# Patient Record
Sex: FEMALE | Race: WHITE | Employment: UNEMPLOYED | URBAN - METROPOLITAN AREA
[De-identification: names, ages, dates, MRNs, and addresses within clinical notes are randomized per-mention and may not be internally consistent; named-entity substitution may affect disease eponyms.]

---

## 2018-10-03 ENCOUNTER — HOSPITAL ENCOUNTER (OUTPATIENT)
Dept: GENERAL RADIOLOGY | Age: 63
Discharge: HOME | End: 2018-10-05
Payer: MEDICARE

## 2018-10-03 DIAGNOSIS — Z12.31 VISIT FOR SCREENING MAMMOGRAM: ICD-10-CM

## 2018-10-03 PROCEDURE — 77067 SCR MAMMO BI INCL CAD: CPT

## 2019-07-25 ENCOUNTER — OFFICE VISIT (OUTPATIENT)
Dept: ENDOCRINOLOGY | Age: 64
End: 2019-07-25
Payer: MEDICARE

## 2019-07-25 VITALS
WEIGHT: 140.8 LBS | HEIGHT: 60 IN | BODY MASS INDEX: 27.64 KG/M2 | SYSTOLIC BLOOD PRESSURE: 120 MMHG | DIASTOLIC BLOOD PRESSURE: 78 MMHG | RESPIRATION RATE: 16 BRPM | OXYGEN SATURATION: 99 % | HEART RATE: 73 BPM

## 2019-07-25 DIAGNOSIS — E78.5 HYPERLIPIDEMIA, UNSPECIFIED HYPERLIPIDEMIA TYPE: ICD-10-CM

## 2019-07-25 DIAGNOSIS — K21.9 GASTROESOPHAGEAL REFLUX DISEASE, ESOPHAGITIS PRESENCE NOT SPECIFIED: ICD-10-CM

## 2019-07-25 PROCEDURE — G8419 CALC BMI OUT NRM PARAM NOF/U: HCPCS | Performed by: INTERNAL MEDICINE

## 2019-07-25 PROCEDURE — 1036F TOBACCO NON-USER: CPT | Performed by: INTERNAL MEDICINE

## 2019-07-25 PROCEDURE — 3017F COLORECTAL CA SCREEN DOC REV: CPT | Performed by: INTERNAL MEDICINE

## 2019-07-25 PROCEDURE — 99204 OFFICE O/P NEW MOD 45 MIN: CPT | Performed by: INTERNAL MEDICINE

## 2019-07-25 PROCEDURE — G8427 DOCREV CUR MEDS BY ELIG CLIN: HCPCS | Performed by: INTERNAL MEDICINE

## 2019-07-25 RX ORDER — FAMOTIDINE 20 MG/1
TABLET, FILM COATED ORAL 2 TIMES DAILY
COMMUNITY
Start: 2019-04-23

## 2019-07-25 RX ORDER — IBUPROFEN 800 MG/1
800 TABLET ORAL 2 TIMES DAILY
COMMUNITY
Start: 2019-04-23

## 2019-07-25 RX ORDER — ZOLPIDEM TARTRATE 5 MG/1
TABLET ORAL
Refills: 0 | COMMUNITY
Start: 2019-07-02

## 2019-07-25 RX ORDER — PRAVASTATIN SODIUM 40 MG
TABLET ORAL DAILY
COMMUNITY
Start: 2019-04-23

## 2019-07-25 RX ORDER — LISINOPRIL AND HYDROCHLOROTHIAZIDE 20; 12.5 MG/1; MG/1
TABLET ORAL DAILY
COMMUNITY
Start: 2019-04-23

## 2019-08-05 ENCOUNTER — HOSPITAL ENCOUNTER (OUTPATIENT)
Dept: ULTRASOUND IMAGING | Age: 64
Discharge: HOME | End: 2019-08-07
Payer: MEDICARE

## 2019-08-05 ENCOUNTER — HOSPITAL ENCOUNTER (OUTPATIENT)
Age: 64
Discharge: HOME | End: 2019-08-05
Payer: MEDICARE

## 2019-08-05 DIAGNOSIS — K21.9 GASTROESOPHAGEAL REFLUX DISEASE, ESOPHAGITIS PRESENCE NOT SPECIFIED: ICD-10-CM

## 2019-08-05 DIAGNOSIS — E78.5 HYPERLIPIDEMIA, UNSPECIFIED HYPERLIPIDEMIA TYPE: ICD-10-CM

## 2019-08-05 PROCEDURE — 86376 MICROSOMAL ANTIBODY EACH: CPT

## 2019-08-05 PROCEDURE — 86800 THYROGLOBULIN ANTIBODY: CPT

## 2019-08-05 PROCEDURE — 76536 US EXAM OF HEAD AND NECK: CPT

## 2019-08-07 LAB
THYROGLOBULIN AB: <0.9 IU/ML (ref 0–4)
THYROID PEROXIDASE (TPO) ABS: 0.5 IU/ML (ref 0–9)

## 2019-10-09 ENCOUNTER — HOSPITAL ENCOUNTER (OUTPATIENT)
Dept: GENERAL RADIOLOGY | Age: 64
Discharge: HOME | End: 2019-10-11
Payer: MEDICARE

## 2019-10-09 DIAGNOSIS — Z12.31 VISIT FOR SCREENING MAMMOGRAM: ICD-10-CM

## 2019-10-09 PROCEDURE — 77063 BREAST TOMOSYNTHESIS BI: CPT

## 2020-10-20 ENCOUNTER — HOSPITAL ENCOUNTER (OUTPATIENT)
Dept: GENERAL RADIOLOGY | Age: 65
Discharge: HOME | End: 2020-10-22
Payer: MEDICARE

## 2020-10-20 PROCEDURE — 77063 BREAST TOMOSYNTHESIS BI: CPT

## 2020-11-18 ENCOUNTER — HOSPITAL ENCOUNTER (OUTPATIENT)
Age: 65
Discharge: HOME | End: 2020-11-20

## 2020-11-18 PROCEDURE — 88305 TISSUE EXAM BY PATHOLOGIST: CPT

## 2020-11-18 PROCEDURE — 87081 CULTURE SCREEN ONLY: CPT

## 2020-11-18 PROCEDURE — 88342 IMHCHEM/IMCYTCHM 1ST ANTB: CPT

## 2020-11-19 LAB — CLOTEST: NORMAL

## 2021-02-20 ENCOUNTER — IMMUNIZATION (OUTPATIENT)
Dept: PRIMARY CARE CLINIC | Age: 66
End: 2021-02-20
Payer: MEDICARE

## 2021-02-20 PROCEDURE — 0001A PR IMM ADMN SARSCOV2 30MCG/0.3ML DIL RECON 1ST DOSE: CPT | Performed by: NURSE PRACTITIONER

## 2021-02-20 PROCEDURE — 91300 COVID-19, PFIZER VACCINE 30MCG/0.3ML DOSE: CPT | Performed by: NURSE PRACTITIONER

## 2021-03-15 ENCOUNTER — IMMUNIZATION (OUTPATIENT)
Dept: PRIMARY CARE CLINIC | Age: 66
End: 2021-03-15
Payer: MEDICARE

## 2021-03-15 PROCEDURE — 91300 COVID-19, PFIZER VACCINE 30MCG/0.3ML DOSE: CPT | Performed by: NURSE PRACTITIONER

## 2021-03-15 PROCEDURE — 0002A COVID-19, PFIZER VACCINE 30MCG/0.3ML DOSE: CPT | Performed by: NURSE PRACTITIONER

## 2021-10-22 ENCOUNTER — HOSPITAL ENCOUNTER (OUTPATIENT)
Dept: GENERAL RADIOLOGY | Age: 66
Discharge: HOME | End: 2021-10-24
Payer: MEDICARE

## 2021-10-22 DIAGNOSIS — Z12.31 VISIT FOR SCREENING MAMMOGRAM: ICD-10-CM

## 2021-10-22 PROCEDURE — 77063 BREAST TOMOSYNTHESIS BI: CPT

## 2022-01-25 ENCOUNTER — TELEPHONE (OUTPATIENT)
Dept: ENDOCRINOLOGY | Age: 67
End: 2022-01-25

## 2022-01-25 NOTE — TELEPHONE ENCOUNTER
Returned patients phone call.  Left voicemail stating to call back to make an appt and it can be made with either Dr Miroslava Curtis or Arabella Patricia

## 2022-03-10 ENCOUNTER — TELEPHONE (OUTPATIENT)
Dept: ENDOCRINOLOGY | Age: 67
End: 2022-03-10

## 2022-03-10 ENCOUNTER — OFFICE VISIT (OUTPATIENT)
Dept: ENDOCRINOLOGY | Age: 67
End: 2022-03-10
Payer: MEDICARE

## 2022-03-10 VITALS
OXYGEN SATURATION: 98 % | SYSTOLIC BLOOD PRESSURE: 111 MMHG | HEIGHT: 60 IN | HEART RATE: 84 BPM | WEIGHT: 141 LBS | DIASTOLIC BLOOD PRESSURE: 70 MMHG | BODY MASS INDEX: 27.68 KG/M2

## 2022-03-10 DIAGNOSIS — E04.2 MULTIPLE THYROID NODULES: Primary | ICD-10-CM

## 2022-03-10 DIAGNOSIS — E55.9 VITAMIN D DEFICIENCY: ICD-10-CM

## 2022-03-10 DIAGNOSIS — E04.2 MULTIPLE THYROID NODULES: ICD-10-CM

## 2022-03-10 LAB
T4 FREE: 1.46 NG/DL (ref 0.93–1.7)
TSH SERPL DL<=0.05 MIU/L-ACNC: 1.97 UIU/ML (ref 0.27–4.2)
VITAMIN D 25-HYDROXY: 72 NG/ML (ref 30–100)

## 2022-03-10 PROCEDURE — 1090F PRES/ABSN URINE INCON ASSESS: CPT | Performed by: CLINICAL NURSE SPECIALIST

## 2022-03-10 PROCEDURE — 99214 OFFICE O/P EST MOD 30 MIN: CPT | Performed by: CLINICAL NURSE SPECIALIST

## 2022-03-10 PROCEDURE — G8400 PT W/DXA NO RESULTS DOC: HCPCS | Performed by: CLINICAL NURSE SPECIALIST

## 2022-03-10 PROCEDURE — G8484 FLU IMMUNIZE NO ADMIN: HCPCS | Performed by: CLINICAL NURSE SPECIALIST

## 2022-03-10 PROCEDURE — 4004F PT TOBACCO SCREEN RCVD TLK: CPT | Performed by: CLINICAL NURSE SPECIALIST

## 2022-03-10 PROCEDURE — G8417 CALC BMI ABV UP PARAM F/U: HCPCS | Performed by: CLINICAL NURSE SPECIALIST

## 2022-03-10 PROCEDURE — 4040F PNEUMOC VAC/ADMIN/RCVD: CPT | Performed by: CLINICAL NURSE SPECIALIST

## 2022-03-10 PROCEDURE — 1123F ACP DISCUSS/DSCN MKR DOCD: CPT | Performed by: CLINICAL NURSE SPECIALIST

## 2022-03-10 PROCEDURE — 3017F COLORECTAL CA SCREEN DOC REV: CPT | Performed by: CLINICAL NURSE SPECIALIST

## 2022-03-10 PROCEDURE — G8427 DOCREV CUR MEDS BY ELIG CLIN: HCPCS | Performed by: CLINICAL NURSE SPECIALIST

## 2022-03-10 RX ORDER — ROSUVASTATIN CALCIUM 20 MG/1
TABLET, COATED ORAL
COMMUNITY
Start: 2022-01-21

## 2022-03-10 NOTE — TELEPHONE ENCOUNTER
----- Message from JUVE Wilson - CNS sent at 3/10/2022  3:01 PM EST -----  Please call patient and inform her thyroid function is normal

## 2022-03-28 ENCOUNTER — HOSPITAL ENCOUNTER (OUTPATIENT)
Dept: ULTRASOUND IMAGING | Age: 67
Discharge: HOME | End: 2022-03-30
Payer: MEDICARE

## 2022-03-28 PROCEDURE — 76536 US EXAM OF HEAD AND NECK: CPT

## 2022-03-30 ENCOUNTER — TELEPHONE (OUTPATIENT)
Dept: ENDOCRINOLOGY | Age: 67
End: 2022-03-30

## 2022-03-30 NOTE — TELEPHONE ENCOUNTER
----- Message from JUVE Tripathi sent at 3/29/2022  8:36 AM EDT -----  Please call patient and inform her I have reviewed thyroid ultrasound. No concerning thyroid nodules. Nodules have unchanged since 2019.   We will continue to observe

## 2022-04-22 ENCOUNTER — HOSPITAL ENCOUNTER (OUTPATIENT)
Dept: GENERAL RADIOLOGY | Age: 67
Discharge: HOME | End: 2022-04-24
Payer: MEDICARE

## 2022-04-22 VITALS — HEIGHT: 60 IN | WEIGHT: 140 LBS | BODY MASS INDEX: 27.48 KG/M2

## 2022-04-22 DIAGNOSIS — Z78.0 POSTMENOPAUSAL: ICD-10-CM

## 2022-04-22 PROCEDURE — 77080 DXA BONE DENSITY AXIAL: CPT

## 2022-05-04 ENCOUNTER — TELEPHONE (OUTPATIENT)
Dept: ORTHOPEDIC SURGERY | Age: 67
End: 2022-05-04

## 2022-05-04 DIAGNOSIS — M65.311 TRIGGER FINGER OF RIGHT THUMB: Primary | ICD-10-CM

## 2022-05-05 ENCOUNTER — OFFICE VISIT (OUTPATIENT)
Dept: ORTHOPEDIC SURGERY | Age: 67
End: 2022-05-05
Payer: MEDICARE

## 2022-05-05 VITALS — BODY MASS INDEX: 27.82 KG/M2 | WEIGHT: 138 LBS | HEIGHT: 59 IN

## 2022-05-05 DIAGNOSIS — M65.311 TRIGGER FINGER OF RIGHT THUMB: Primary | ICD-10-CM

## 2022-05-05 PROCEDURE — 1036F TOBACCO NON-USER: CPT | Performed by: ORTHOPAEDIC SURGERY

## 2022-05-05 PROCEDURE — 20550 NJX 1 TENDON SHEATH/LIGAMENT: CPT | Performed by: ORTHOPAEDIC SURGERY

## 2022-05-05 PROCEDURE — G8417 CALC BMI ABV UP PARAM F/U: HCPCS | Performed by: ORTHOPAEDIC SURGERY

## 2022-05-05 PROCEDURE — G8427 DOCREV CUR MEDS BY ELIG CLIN: HCPCS | Performed by: ORTHOPAEDIC SURGERY

## 2022-05-05 PROCEDURE — 99203 OFFICE O/P NEW LOW 30 MIN: CPT | Performed by: ORTHOPAEDIC SURGERY

## 2022-05-05 PROCEDURE — 1090F PRES/ABSN URINE INCON ASSESS: CPT | Performed by: ORTHOPAEDIC SURGERY

## 2022-05-05 PROCEDURE — 3017F COLORECTAL CA SCREEN DOC REV: CPT | Performed by: ORTHOPAEDIC SURGERY

## 2022-05-05 PROCEDURE — 4040F PNEUMOC VAC/ADMIN/RCVD: CPT | Performed by: ORTHOPAEDIC SURGERY

## 2022-05-05 PROCEDURE — 1123F ACP DISCUSS/DSCN MKR DOCD: CPT | Performed by: ORTHOPAEDIC SURGERY

## 2022-05-05 PROCEDURE — G8399 PT W/DXA RESULTS DOCUMENT: HCPCS | Performed by: ORTHOPAEDIC SURGERY

## 2022-05-05 RX ORDER — BETAMETHASONE SODIUM PHOSPHATE AND BETAMETHASONE ACETATE 3; 3 MG/ML; MG/ML
6 INJECTION, SUSPENSION INTRA-ARTICULAR; INTRALESIONAL; INTRAMUSCULAR; SOFT TISSUE ONCE
Status: COMPLETED | OUTPATIENT
Start: 2022-05-05 | End: 2022-05-05

## 2022-05-05 RX ORDER — CALCIUM CARBONATE 500(1250)
500 TABLET ORAL DAILY
COMMUNITY

## 2022-05-05 RX ADMIN — BETAMETHASONE SODIUM PHOSPHATE AND BETAMETHASONE ACETATE 6 MG: 3; 3 INJECTION, SUSPENSION INTRA-ARTICULAR; INTRALESIONAL; INTRAMUSCULAR; SOFT TISSUE at 14:30

## 2022-05-05 NOTE — PROGRESS NOTES
Department of Orthopedic Surgery  History and Physical      CHIEF COMPLAINT: Right hand pain    HISTORY OF PRESENT ILLNESS:                The patient is a right-hand-dominant 79 y.o. female who presents with pain to the right hand for the past few months. Patient notes that the pain is localized mostly to the volar base of the thumb. She also is experiencing triggering of the thumb and limits range of motion to the thumb secondary to the triggering and pain. She has noticed this pain and triggering affecting her ADLs and notes that she uses to has open a doorknob now instead of one hand. Denies numbness or tingling or paresthesias. 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 Allergies:  Penicillins    Social History:   TOBACCO:   reports that she has never smoked. She has never used smokeless tobacco.  ETOH:   reports previous alcohol use. DRUGS:   reports previous drug use.   ACTIVITIES OF DAILY LIVING:    OCCUPATION:    Family History:       Problem Relation Age of Onset    Cancer Mother         blood    Hypothyroidism Mother    Piedad Emanuel Cancer Father         blood    No Known Problems Sister     No Known Problems Daughter     Other Daughter         hyperparathyroidism       REVIEW OF SYSTEMS:  Constitutional: Negative for fevers or chills  Cardiovascular: Negative for chest pain  Respiratory: Negative for shortness of breath  Musculoskeletal: Positive for right thumb pain  Neurological: Negative for numbness or tingling  Behavioral/psychological: Negative for increased agitation    PHYSICAL EXAM:    VITALS:  Ht 4' 11\" (1.499 m)   Wt 138 lb (62.6 kg)   BMI 27.87 kg/m²   CONSTITUTIONAL:  awake, alert, cooperative, no apparent distress, and appears stated age  EYES:  Lids and lashes normal, pupils equal, round and reactive to light, extra ocular muscles intact, sclera clear, conjunctiva normal  ENT:  Normocephalic, without obvious abnormality, atraumatic, sinuses nontender on palpation, external ears without lesions, oral pharynx with moist mucus membranes, tonsils without erythema or exudates, gums normal and good dentition. NECK:  Supple, symmetrical, trachea midline, no adenopathy, thyroid symmetric, not enlarged and no tenderness, skin normal  LUNGS:  CTA  CARDIOVASCULAR:  2+ radial pulses, extremities warm and well perfused  ABDOMEN: Nondistended, NTTP  CHEST:  Atraumatic   GENITAL/URINARY:  deferred  NEUROLOGIC:  Awake, alert, oriented to name, place and time. Cranial nerves II-XII are grossly intact. Motor is 5 out of 5 bilaterally. Sensory is intact.  gait is normal.  MUSCULOSKELETAL:    Right upper extremity:  · Skin intact circumferentially  · +TTP overlying the thumb A1 pulley and index finger A1 pulley.   Thumb>index finger  · Compartments soft and compressible  · +AIN/PIN/Ulnar nerve function intact grossly  · +Radial pulse, Brisk Cap refill, hand warm and perfused  · Sensation intact to touch in radial/ulnar/median nerve distributions to hand  · Negative Tinel's at the carpal tunnel  · negative CMC grind test    DATA:    CBC:   Lab Results   Component Value Date    WBC 8.2 07/25/2017    RBC 5.04 07/25/2017    HGB 15.4 07/25/2017    HCT 46.2 07/25/2017    MCV 91.7 07/25/2017    MCH 30.6 07/25/2017    MCHC 33.3 07/25/2017    RDW 12.2 07/25/2017     07/25/2017    MPV 10.4 07/25/2017     PT/INR:  No results found for: PROTIME, INR    Radiology Review:   Xray: x-rays of the right hand were obtained today in the office and reviewed with the patient. 3 views: AP, oblique, and lateral: demonstrate no acute osseous abnormalities. No fractures or dislocations identified on this study. Impression: Normal hand radiograph    IMPRESSION:  · Right thumb trigger finger  · GERD  · Hypertension  · Hyperlipidemia  · History of thyroid nodules    Procedure Note trigger Injection    The right Thumb MCP joint volar aspect was identified as the injection site. The risk and benefits of a cortisone injection were explained and the patient consented to the injection. Under sterile conditions, the A1 pulley was injected with a mixture of 1 mL of 1% Lidocaine and 1 mL of 6 mg/mL Betamethasone without complication. A sterile bandage was applied. PLAN:  Discussed operative and nonoperative management of her condition. Is reasonable at this time to trial nonoperative management with activity modification and corticosteroid injection. Patient underwent right thumb trigger finger injection that was uncomplicated and patient tolerated well. She is to continue performing activity modification and follow-up in 6 weeks or 3 months depending on her response to the corticosteroid injection. It was explained to her that the soonest another corticosteroid injection can be performed is in 3 months from today's date. She is also to begin splinting at night. Patient notes that she has a splint already. Patient verbalized understanding of treatment plan, asked appropriate questions, and was in agreement. All questions were sought and answered today's visit. I have seen and evaluated the patient and agree with the above assessment and plan on today's visit. I have performed the key components of the history and physical examination with significant findings of trigger thumb. I concur with the findings and plan as documented.     Radha Michelle MD  5/5/2022

## 2022-05-05 NOTE — PATIENT INSTRUCTIONS
Patient Education        Trigger Finger: Care Instructions  Overview  A trigger finger is a finger stuck in a bent position. It happens when the tendon that bends and straightens the thumb or finger can't slide smoothly under the ligaments that hold the tendon against the bones. In most cases, it's caused by a bump (nodule) that forms on the tendon. The bent finger usuallystraightens out on its own. A trigger finger can be painful. But it normally isn't a serious problem. Trigger fingers seem to occur more in some groups of people. These groupsinclude:   People who have diabetes or arthritis.  People who have injured their hands in the past.   Musicians.  People who  tools often. Rest and exercises may help your finger relax so that it can bend. You may get a corticosteroid shot. This can reduce swelling and pain. Your doctor may put a splint on your finger. It will give your finger some rest. Courtney Clarke need surgery if the finger keeps locking in a bent position. Follow-up care is a key part of your treatment and safety. Be sure to make and go to all appointments, and call your doctor if you are having problems. It's also a good idea to know your test results and keep alist of the medicines you take. How can you care for yourself at home?  If your doctor put a splint on your finger, wear it as directed. Don't take it off until your doctor says you can.  You may need to change your activities to avoid movements that irritate the finger.  Take your medicines exactly as prescribed. Call your doctor if you think you are having a problem with your medicine.  Ask your doctor if you can take an over-the-counter pain medicine, such as acetaminophen (Tylenol), ibuprofen (Advil, Motrin), or naproxen (Aleve). Be safe with medicines. Read and follow all instructions on the label.  If your doctor recommends exercises, do them as directed. When should you call for help?    Call your doctor now or seek immediate medical care if:     Your finger locks in a bent position and will not straighten. Watch closely for changes in your health, and be sure to contact your doctor if:     You do not get better as expected. Where can you learn more? Go to [email or web address] and sign in to your [employer] account. Enter M826 in the mobileo box to learn more about \"Trigger Finger: Care Instructions. \"     If you do not have an account, please click on the \"Sign Up Now\" link. Current as of: July 1, 2021               Content Version: 13.2  © 7154-9489 [employer], Incorporated. Care instructions adapted under license by [facility] ([facility]). If you have questions about a medical condition or this instruction, always ask your healthcare professional. [address] any warranty or liability for your use of this information.

## 2022-06-16 ENCOUNTER — OFFICE VISIT (OUTPATIENT)
Dept: ORTHOPEDIC SURGERY | Age: 67
End: 2022-06-16
Payer: MEDICARE

## 2022-06-16 VITALS — HEIGHT: 60 IN | BODY MASS INDEX: 27.48 KG/M2 | WEIGHT: 140 LBS

## 2022-06-16 DIAGNOSIS — M65.311 TRIGGER FINGER OF RIGHT THUMB: Primary | ICD-10-CM

## 2022-06-16 PROCEDURE — 3017F COLORECTAL CA SCREEN DOC REV: CPT | Performed by: ORTHOPAEDIC SURGERY

## 2022-06-16 PROCEDURE — G8427 DOCREV CUR MEDS BY ELIG CLIN: HCPCS | Performed by: ORTHOPAEDIC SURGERY

## 2022-06-16 PROCEDURE — 1090F PRES/ABSN URINE INCON ASSESS: CPT | Performed by: ORTHOPAEDIC SURGERY

## 2022-06-16 PROCEDURE — 1123F ACP DISCUSS/DSCN MKR DOCD: CPT | Performed by: ORTHOPAEDIC SURGERY

## 2022-06-16 PROCEDURE — 99213 OFFICE O/P EST LOW 20 MIN: CPT | Performed by: ORTHOPAEDIC SURGERY

## 2022-06-16 PROCEDURE — G8399 PT W/DXA RESULTS DOCUMENT: HCPCS | Performed by: ORTHOPAEDIC SURGERY

## 2022-06-16 PROCEDURE — 1036F TOBACCO NON-USER: CPT | Performed by: ORTHOPAEDIC SURGERY

## 2022-06-16 PROCEDURE — G8417 CALC BMI ABV UP PARAM F/U: HCPCS | Performed by: ORTHOPAEDIC SURGERY

## 2022-06-16 RX ORDER — FLUTICASONE PROPIONATE 50 MCG
1-2 SPRAY, SUSPENSION (ML) NASAL
COMMUNITY
Start: 2022-03-18

## 2022-06-16 RX ORDER — AZELASTINE HYDROCHLORIDE 137 UG/1
SPRAY, METERED NASAL
COMMUNITY
Start: 2022-05-11

## 2022-06-16 NOTE — PROGRESS NOTES
Department of Orthopedic Surgery  History and Physical      CHIEF COMPLAINT: Right hand pain    HISTORY OF PRESENT ILLNESS:                The patient is a right-hand-dominant 79 y.o. female who presents with pain to the right hand for the past few months. Patient notes that the pain is localized mostly to the volar base of the thumb. She also is experiencing triggering of the thumb and limits range of motion to the thumb secondary to the triggering and pain. She has noticed this pain and triggering affecting her ADLs and notes that she uses to has open a doorknob now instead of one hand. Denies numbness or tingling or paresthesias. 6 weeks ago the patient had a cortisone injection to the right thumb A1 pulley. Patient states this did not provide her any relief of her symptoms. She is still having active triggering of her right thumb. 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 Allergies:  Penicillins    Social History:   TOBACCO:   reports that she has never smoked. She has never used smokeless tobacco.  ETOH:   reports previous alcohol use. DRUGS:   reports previous drug use.   ACTIVITIES OF DAILY LIVING:    OCCUPATION:    Family History:       Problem Relation Age of Onset    Cancer Mother         blood    Hypothyroidism Mother    Connye Sole Cancer Father         blood    No Known Problems Sister     No Known Problems Daughter     Other Daughter         hyperparathyroidism REVIEW OF SYSTEMS:  Constitutional: Negative for fevers or chills  Cardiovascular: Negative for chest pain  Respiratory: Negative for shortness of breath  Musculoskeletal: Positive for right thumb pain  Neurological: Negative for numbness or tingling  Behavioral/psychological: Negative for increased agitation    PHYSICAL EXAM:    VITALS:  Ht 5' (1.524 m)   Wt 140 lb (63.5 kg)   BMI 27.34 kg/m²   CONSTITUTIONAL:  awake, alert, cooperative, no apparent distress, and appears stated age  EYES:  Lids and lashes normal, pupils equal, round and reactive to light, extra ocular muscles intact, sclera clear, conjunctiva normal  ENT:  Normocephalic, without obvious abnormality, atraumatic, sinuses nontender on palpation, external ears without lesions, oral pharynx with moist mucus membranes, tonsils without erythema or exudates, gums normal and good dentition. NECK:  Supple, symmetrical, trachea midline, no adenopathy, thyroid symmetric, not enlarged and no tenderness, skin normal  LUNGS:  CTA  CARDIOVASCULAR:  2+ radial pulses, extremities warm and well perfused  ABDOMEN: Nondistended, NTTP  CHEST:  Atraumatic   GENITAL/URINARY:  deferred  NEUROLOGIC:  Awake, alert, oriented to name, place and time. Cranial nerves II-XII are grossly intact. Motor is 5 out of 5 bilaterally.   Sensory is intact.  gait is normal.  MUSCULOSKELETAL:    Right upper extremity:  · Skin intact circumferentially  · +TTP overlying the thumb A1 pulley  with active triggering  · Compartments soft and compressible  · +AIN/PIN/Ulnar nerve function intact grossly  · +Radial pulse, Brisk Cap refill, hand warm and perfused  · Sensation intact to touch in radial/ulnar/median nerve distributions to hand  · Negative Tinel's at the carpal tunnel  · negative CMC grind test    DATA:    CBC:   Lab Results   Component Value Date    WBC 8.2 07/25/2017    RBC 5.04 07/25/2017    HGB 15.4 07/25/2017    HCT 46.2 07/25/2017    MCV 91.7 07/25/2017    MCH 30.6 07/25/2017    MCHC 33.3 07/25/2017    RDW 12.2 07/25/2017     07/25/2017    MPV 10.4 07/25/2017     PT/INR:  No results found for: PROTIME, INR      IMPRESSION:  · Right thumb trigger finger  · GERD  · Hypertension  · Hyperlipidemia  · History of thyroid nodules      PLAN:  Discussed findings with patient. Discussed conservative and surgical management with patient. Ultimately patient decided she would like to proceed with surgical management. Plan for a right thumb trigger release. Postoperative course explained to the patient. Patient like to proceed. All questions answered. I explained the risks, benefits, alternatives and complications of surgery with the patient including but not limited to the risks of infection, possible damage to nerves, vessels, or tendons, stiffness, loss of range of motion, scar sensitivity, wound healing complications, worsening symptoms, possible need for therapy, as well as the possible need further surgery and unanticipated complications. The patient voiced understanding and all questions were answered. The patient elected to proceed with surgical intervention. I have seen and evaluated the patient and agree with the above assessment and plan on today's visit. I have performed the key components of the history and physical examination with significant findings of persistent and recalcitrant right thumb triggering. Treatment options were again explained. She like to proceed with surgical intervention. . I concur with the findings and plan as documented.     Madison Kent MD  6/16/2022

## 2022-06-22 ENCOUNTER — HOSPITAL ENCOUNTER (OUTPATIENT)
Dept: CT IMAGING | Age: 67
Discharge: HOME | End: 2022-06-24
Payer: MEDICARE

## 2022-06-22 DIAGNOSIS — J32.9 CHRONIC SINUSITIS, UNSPECIFIED LOCATION: ICD-10-CM

## 2022-06-22 PROCEDURE — 70486 CT MAXILLOFACIAL W/O DYE: CPT

## 2022-07-08 ENCOUNTER — TELEPHONE (OUTPATIENT)
Dept: ORTHOPEDIC SURGERY | Age: 67
End: 2022-07-08

## 2022-07-08 NOTE — TELEPHONE ENCOUNTER
Spoke with insurance company. No authorization required for surgery on 7/29.   Call reference # [id number]

## 2022-07-20 ENCOUNTER — CLINICAL DOCUMENTATION (OUTPATIENT)
Dept: ORTHOPEDIC SURGERY | Age: 67
End: 2022-07-20

## 2022-07-20 NOTE — PROGRESS NOTES
Department of Orthopedic Surgery  History and Physical      CHIEF COMPLAINT: Right hand pain    HISTORY OF PRESENT ILLNESS:                The patient is a right-hand-dominant 79 y.o. female who presents with pain to the right hand for the past few months. Patient notes that the pain is localized mostly to the volar base of the thumb. She also is experiencing triggering of the thumb and limits range of motion to the thumb secondary to the triggering and pain. She has noticed this pain and triggering affecting her ADLs and notes that she uses to has open a doorknob now instead of one hand. Denies numbness or tingling or paresthesias. 6 weeks ago the patient had a cortisone injection to the right thumb A1 pulley. Patient states this did not provide her any relief of her symptoms. She is still having active triggering of her right thumb. 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 Allergies:  Penicillins    Social History:   TOBACCO:   reports that she has never smoked. She has never used smokeless tobacco.  ETOH:   reports that she does not currently use alcohol. DRUGS:   reports that she does not currently use drugs.   ACTIVITIES OF DAILY LIVING:    OCCUPATION:    Family History:       Problem Relation Age of Onset    Cancer Mother         blood    Hypothyroidism Mother     Cancer Father         blood    No Known Problems Sister     No Known Problems Daughter     Other Daughter hyperparathyroidism       REVIEW OF SYSTEMS:  Constitutional: Negative for fevers or chills  Cardiovascular: Negative for chest pain  Respiratory: Negative for shortness of breath  Musculoskeletal: Positive for right thumb pain  Neurological: Negative for numbness or tingling  Behavioral/psychological: Negative for increased agitation    PHYSICAL EXAM:    VITALS:  There were no vitals taken for this visit. CONSTITUTIONAL:  awake, alert, cooperative, no apparent distress, and appears stated age  EYES:  Lids and lashes normal, pupils equal, round and reactive to light, extra ocular muscles intact, sclera clear, conjunctiva normal  ENT:  Normocephalic, without obvious abnormality, atraumatic, sinuses nontender on palpation, external ears without lesions, oral pharynx with moist mucus membranes, tonsils without erythema or exudates, gums normal and good dentition. NECK:  Supple, symmetrical, trachea midline, no adenopathy, thyroid symmetric, not enlarged and no tenderness, skin normal  LUNGS:  CTA  CARDIOVASCULAR:  2+ radial pulses, extremities warm and well perfused  ABDOMEN: Nondistended, NTTP  CHEST:  Atraumatic   GENITAL/URINARY:  deferred  NEUROLOGIC:  Awake, alert, oriented to name, place and time. Cranial nerves II-XII are grossly intact. Motor is 5 out of 5 bilaterally.   Sensory is intact.  gait is normal.  MUSCULOSKELETAL:    Right upper extremity:  Skin intact circumferentially  +TTP overlying the thumb A1 pulley  with active triggering  Compartments soft and compressible  +AIN/PIN/Ulnar nerve function intact grossly  +Radial pulse, Brisk Cap refill, hand warm and perfused  Sensation intact to touch in radial/ulnar/median nerve distributions to hand  Negative Tinel's at the carpal tunnel  negative CMC grind test    DATA:    CBC:   Lab Results   Component Value Date/Time    WBC 8.2 07/25/2017 01:25 PM    RBC 5.04 07/25/2017 01:25 PM    HGB 15.4 07/25/2017 01:25 PM    HCT 46.2 07/25/2017 01:25 PM    MCV 91.7 07/25/2017 01:25 PM    MCH 30.6 07/25/2017 01:25 PM    MCHC 33.3 07/25/2017 01:25 PM    RDW 12.2 07/25/2017 01:25 PM     07/25/2017 01:25 PM    MPV 10.4 07/25/2017 01:25 PM     PT/INR:  No results found for: PROTIME, INR      IMPRESSION:  Right thumb trigger finger  GERD  Hypertension  Hyperlipidemia  History of thyroid nodules      PLAN:  Discussed findings with patient. Discussed conservative and surgical management with patient. Ultimately patient decided she would like to proceed with surgical management. Plan for a right thumb trigger release. Postoperative course explained to the patient. Patient like to proceed. All questions answered. I explained the risks, benefits, alternatives and complications of surgery with the patient including but not limited to the risks of infection, possible damage to nerves, vessels, or tendons, stiffness, loss of range of motion, scar sensitivity, wound healing complications, worsening symptoms, possible need for therapy, as well as the possible need further surgery and unanticipated complications. The patient voiced understanding and all questions were answered. The patient elected to proceed with surgical intervention. I have seen and evaluated the patient and agree with the above assessment and plan on today's visit. I have performed the key components of the history and physical examination with significant findings of persistent and recalcitrant right thumb triggering. Treatment options were again explained. She like to proceed with surgical intervention. . I concur with the findings and plan as documented. History and Physical Update     Patient was seen and examined. Patient's history and physical was reviewed with the patient. There has been no significant interval changes.       Electronically signed by VANI Victor on 7/20/2022 at 8:27 AM

## 2022-07-29 ENCOUNTER — TELEPHONE (OUTPATIENT)
Dept: ORTHOPEDIC SURGERY | Age: 67
End: 2022-07-29

## 2022-07-29 DIAGNOSIS — M65.311 TRIGGER FINGER OF RIGHT THUMB: Primary | ICD-10-CM

## 2022-07-29 RX ORDER — HYDROCODONE BITARTRATE AND ACETAMINOPHEN 5; 325 MG/1; MG/1
1 TABLET ORAL EVERY 4 HOURS PRN
Qty: 12 TABLET | Refills: 0 | Status: SHIPPED | OUTPATIENT
Start: 2022-07-29 | End: 2022-08-01

## 2022-08-08 ENCOUNTER — TELEPHONE (OUTPATIENT)
Dept: ORTHOPEDIC SURGERY | Age: 67
End: 2022-08-08

## 2022-08-08 NOTE — TELEPHONE ENCOUNTER
Patient was called back asked to cancel appointment today due to back spasms. She is trying to get medication from PCP. She will call later for update so she can be rescheduled later today or this week.

## 2022-08-08 NOTE — TELEPHONE ENCOUNTER
Pt called in to r/s her post op appt, she is having back spasms. She would like to know if she can come in this Thursday? Liangteo Sanderse can be reached at [phone]. Unable to reach office due to pt care.

## 2022-08-18 ENCOUNTER — OFFICE VISIT (OUTPATIENT)
Dept: ORTHOPEDIC SURGERY | Age: 67
End: 2022-08-18

## 2022-08-18 VITALS — RESPIRATION RATE: 20 BRPM | HEIGHT: 60 IN | BODY MASS INDEX: 27.48 KG/M2 | WEIGHT: 140 LBS

## 2022-08-18 DIAGNOSIS — M54.42 CHRONIC BILATERAL LOW BACK PAIN WITH BILATERAL SCIATICA: Primary | ICD-10-CM

## 2022-08-18 DIAGNOSIS — M65.311 TRIGGER FINGER OF RIGHT THUMB: ICD-10-CM

## 2022-08-18 DIAGNOSIS — G89.29 CHRONIC BILATERAL LOW BACK PAIN WITH BILATERAL SCIATICA: Primary | ICD-10-CM

## 2022-08-18 DIAGNOSIS — M54.41 CHRONIC BILATERAL LOW BACK PAIN WITH BILATERAL SCIATICA: Primary | ICD-10-CM

## 2022-08-18 PROCEDURE — 99024 POSTOP FOLLOW-UP VISIT: CPT | Performed by: ORTHOPAEDIC SURGERY

## 2022-08-18 NOTE — PROGRESS NOTES
HPI:   Patient follows up today s/p right thumb trigger finger release. Patient is now 3 weeks out from her operative procedure. She had her neighbor take her sutures out. She denies any new issues other than some sensitivity over her scar. She uses the hand for ADLs without issue. No complaints. Pain controlled. Physical Exam:  right thumb incision clean, dry, and intact  No signs of infection, some previous callous has been removed showing clean, healthy tissue around her scar. ROM appropriate  NVI  Radial, median, and ulnar sensation intact to light touch  Brisk capillary refill  Muscle strength 5/5 grossly    Assessment:  Post-op 3 weeks from right thumb trigger finger release. Plan:  Patient may begin silicone coverings for scar sensitivity. She will continue to do scar massage for sensitivity as well. Follow up as needed for any issues. 8/18/2022  Martinez Larry,      I have seen and evaluated the patient and agree with the above assessment and plan on today's visit. I have performed the key components of the history and physical examination with significant findings of 3 weeks postop. I concur with the findings and plan as documented.     Cary Simmons MD  8/18/2022

## 2022-10-24 ENCOUNTER — HOSPITAL ENCOUNTER (OUTPATIENT)
Dept: GENERAL RADIOLOGY | Age: 67
Discharge: HOME | End: 2022-10-26
Payer: MEDICARE

## 2022-10-24 DIAGNOSIS — Z12.31 ENCOUNTER FOR SCREENING MAMMOGRAM FOR MALIGNANT NEOPLASM OF BREAST: ICD-10-CM

## 2022-10-24 PROCEDURE — 77067 SCR MAMMO BI INCL CAD: CPT

## 2023-01-18 ENCOUNTER — OFFICE VISIT (OUTPATIENT)
Dept: NEUROSURGERY | Age: 68
End: 2023-01-18
Payer: MEDICARE

## 2023-01-18 VITALS
TEMPERATURE: 98 F | WEIGHT: 140 LBS | OXYGEN SATURATION: 98 % | DIASTOLIC BLOOD PRESSURE: 85 MMHG | RESPIRATION RATE: 16 BRPM | BODY MASS INDEX: 27.34 KG/M2 | SYSTOLIC BLOOD PRESSURE: 135 MMHG | HEART RATE: 98 BPM

## 2023-01-18 DIAGNOSIS — M54.42 ACUTE MIDLINE LOW BACK PAIN WITH BILATERAL SCIATICA: Primary | ICD-10-CM

## 2023-01-18 DIAGNOSIS — M54.41 ACUTE MIDLINE LOW BACK PAIN WITH BILATERAL SCIATICA: Primary | ICD-10-CM

## 2023-01-18 PROCEDURE — G8484 FLU IMMUNIZE NO ADMIN: HCPCS | Performed by: NEUROLOGICAL SURGERY

## 2023-01-18 PROCEDURE — 3017F COLORECTAL CA SCREEN DOC REV: CPT | Performed by: NEUROLOGICAL SURGERY

## 2023-01-18 PROCEDURE — G8417 CALC BMI ABV UP PARAM F/U: HCPCS | Performed by: NEUROLOGICAL SURGERY

## 2023-01-18 PROCEDURE — G8427 DOCREV CUR MEDS BY ELIG CLIN: HCPCS | Performed by: NEUROLOGICAL SURGERY

## 2023-01-18 PROCEDURE — G8399 PT W/DXA RESULTS DOCUMENT: HCPCS | Performed by: NEUROLOGICAL SURGERY

## 2023-01-18 PROCEDURE — 1090F PRES/ABSN URINE INCON ASSESS: CPT | Performed by: NEUROLOGICAL SURGERY

## 2023-01-18 PROCEDURE — 1036F TOBACCO NON-USER: CPT | Performed by: NEUROLOGICAL SURGERY

## 2023-01-18 PROCEDURE — 1123F ACP DISCUSS/DSCN MKR DOCD: CPT | Performed by: NEUROLOGICAL SURGERY

## 2023-01-18 PROCEDURE — 99202 OFFICE O/P NEW SF 15 MIN: CPT

## 2023-01-18 PROCEDURE — 99204 OFFICE O/P NEW MOD 45 MIN: CPT | Performed by: NEUROLOGICAL SURGERY

## 2023-01-18 ASSESSMENT — ENCOUNTER SYMPTOMS
RESPIRATORY NEGATIVE: 1
EYES NEGATIVE: 1
BACK PAIN: 1
GASTROINTESTINAL NEGATIVE: 1
ALLERGIC/IMMUNOLOGIC NEGATIVE: 1

## 2023-01-18 NOTE — PROGRESS NOTES
Abby Peace (:  1955) is a 79 y.o. female,New patient, here for evaluation of the following chief complaint(s):  Back Pain (Low back pain   brought disc had injections done at pain clinic  therapy done in the past but not this time  )         ASSESSMENT/PLAN:  1. Acute midline low back pain with bilateral sciatica  79year old lady who presents with back and leg pain. Her MRI shows stenosis and spondylolisthesis at L4-L5. She has failed physical therapy and epidurals. I am recommending an L4-L5 posterior lumbar interbody fusion. Risks, benefits and alternatives of surgery have been discussed. She will think about it. She is a non-smoker    No follow-ups on file. Subjective   SUBJECTIVE/OBJECTIVE:  HPI  79year old lady who presents with back pain. The pain radiates into both legs. The pain is made worse with activity and better with rest.  She has tried physical therapy and epidural injections. The pain is rated as 7/10. She admits to numbness, tingling and weakness in her legs. She denies loss of control of bowel or bladder function. She cant walk too far. The pain is described as sharp, stabbing, burning, cramping and shooting. Review of Systems   Constitutional: Negative. HENT: Negative. Eyes: Negative. Respiratory: Negative. Cardiovascular: Negative. Gastrointestinal: Negative. Endocrine: Negative. Genitourinary: Negative. Musculoskeletal:  Positive for arthralgias and back pain. Skin: Negative. Allergic/Immunologic: Negative. Neurological: Negative. Hematological:  Bruises/bleeds easily. Psychiatric/Behavioral:  Positive for dysphoric mood. Objective   Physical Exam  Vitals reviewed. Constitutional:       General: She is not in acute distress. Appearance: Normal appearance. She is normal weight. She is not ill-appearing, toxic-appearing or diaphoretic. HENT:      Head: Normocephalic and atraumatic.       Nose: Nose normal.   Eyes:      General: No visual field deficit.     Extraocular Movements: Extraocular movements intact.      Conjunctiva/sclera: Conjunctivae normal.      Pupils: Pupils are equal, round, and reactive to light.   Pulmonary:      Effort: Pulmonary effort is normal. No respiratory distress.   Abdominal:      General: Abdomen is flat. There is no distension.   Musculoskeletal:         General: No swelling, tenderness, deformity or signs of injury.      Right lower leg: No edema.      Left lower leg: No edema.   Skin:     Capillary Refill: Capillary refill takes less than 2 seconds.      Coloration: Skin is not jaundiced or pale.      Findings: No bruising, erythema, lesion or rash.   Neurological:      General: No focal deficit present.      Mental Status: She is alert and oriented to person, place, and time. Mental status is at baseline.      Cranial Nerves: No cranial nerve deficit or facial asymmetry.      Sensory: Sensation is intact. No sensory deficit.      Motor: Weakness present. No tremor, atrophy, abnormal muscle tone, seizure activity or pronator drift.      Coordination: Coordination normal.      Gait: Gait normal.      Deep Tendon Reflexes: Reflexes normal. Babinski sign absent on the right side. Babinski sign absent on the left side.      Reflex Scores:       Tricep reflexes are 2+ on the right side and 2+ on the left side.       Bicep reflexes are 2+ on the right side and 2+ on the left side.       Brachioradialis reflexes are 2+ on the right side and 2+ on the left side.       Patellar reflexes are 2+ on the right side and 2+ on the left side.       Achilles reflexes are 2+ on the right side and 2+ on the left side.     Comments: 4/5 in RLE   Psychiatric:         Mood and Affect: Mood normal.         Behavior: Behavior normal.         Thought Content: Thought content normal.         Judgment: Judgment normal.          On this date 1/18/2023 I have spent 45 minutes reviewing previous notes, test  results and face to face with the patient discussing the diagnosis and importance of compliance with the treatment plan as well as documenting on the day of the visit. An electronic signature was used to authenticate this note.     --Jose Anderson MD

## 2023-02-03 ENCOUNTER — TELEPHONE (OUTPATIENT)
Dept: NEUROSURGERY | Age: 68
End: 2023-02-03

## 2023-02-03 NOTE — TELEPHONE ENCOUNTER
Patient would like to proceed with surgery with Dr Teddy Altman. She would like surgery last 2 wks of April.

## 2023-02-08 DIAGNOSIS — M43.16 SPONDYLOLISTHESIS OF LUMBAR REGION: Primary | ICD-10-CM

## 2023-03-08 NOTE — PROGRESS NOTES
[address] [facility] Department of Endocrinology Diabetes and Metabolism   [address] [facility] [address]   Phone: [phone]  Fax: [phone]    Date of Service: 3/10/2022    Primary Care Physician: Ajay Buck DO  Referring physician: No ref. provider found  Provider: JUVE Azar - CNS     Reason for the visit:  Thyroid nodules      History of Present Illness: The history is provided by the patient. No  was used. Accuracy of the patient data is excellent. Goldie Ennis is a very pleasant 77 y.o. female seen today for thyroid nodules     Dx with thyroid nodules in 1999   Context:  GYN felt enlargement of her thyroid gland. Ultrasound was performed that showed thyroid nodule. Last thyroid ultrasound 8/2019 showed left lobe measuring 5.3 x 1.5 x 1.6 and right lobe to measure 5.2 x 1.6 x 1.7. Isthmus 4 mm  2 subcentimeter thyroid nodules in the left lobe identified  TPO ab 0.5 (8/2019)   TSH 0.65 (7/2019)  TSH WNL (9/21)  Has been sick with sinusitis   No biotin  No vitamin B12 complex  On vitamin D supplementation      PAST MEDICAL HISTORY   Past Medical History:   Diagnosis Date    GERD (gastroesophageal reflux disease)     HLD (hyperlipidemia)     HTN (hypertension)        PAST SURGICAL HISTORY   Past Surgical History:   Procedure Laterality Date    FOOT SURGERY      clay's neuroma    LUMBAR DISCECTOMY         SOCIAL HISTORY   Tobacco:   reports that she has never smoked. She has never used smokeless tobacco.  Alcohol:   reports previous alcohol use. Drugs:   reports previous drug use.     FAMILY HISTORY   Family History   Problem Relation Age of Onset    Cancer Mother         blood    Hypothyroidism Mother     Cancer Father         blood    No Known Problems Sister     No Known Problems Daughter     Other Daughter         hyperparathyroidism       ALLERGIES AND DRUG REACTIONS   Allergies   Allergen Reactions    Penicillins Hives and Swelling     Thoat closes       CURRENT MEDICATIONS   Current Outpatient Medications   Medication Sig Dispense Refill    Cholecalciferol (VITAMIN D3) 5000 units TABS Take by mouth daily      lisinopril-hydrochlorothiazide (PRINZIDE;ZESTORETIC) 20-12.5 MG per tablet daily       famotidine (PEPCID) 20 MG tablet 2 times daily       ibuprofen (ADVIL;MOTRIN) 800 MG tablet 800 mg 2 times daily       zolpidem (AMBIEN) 5 MG tablet TAKE 1 TABLET BY MOUTH EVERY DAY AT BEDTIME AS NEEDED  0    rosuvastatin (CRESTOR) 20 MG tablet TAKE ONE TABLET BY MOUTH EVERY DAY      pravastatin (PRAVACHOL) 40 MG tablet daily  (Patient not taking: Reported on 3/10/2022)       No current facility-administered medications for this visit. Review of Systems  Constitutional: No fever, no chills, no diaphoresis, no generalized weakness. HEENT: No blurred vision, No sore throat, no ear pain, no hair loss  Neck: denied any neck swelling, difficulty swallowing,   Cardio-pulmonary: No CP, SOB or palpitation, No orthopnea or PND. No cough or wheezing. GI: No N/V/D, no constipation, No abdominal pain, no melena or hematochezia   : Denied any dysuria, hematuria, flank pain, discharge, or incontinence. Skin: denied any rash, ulcer, Hirsute, or hyperpigmentation. MSK: denied any joint deformity, joint pain/swelling, muscle pain, or back pain. Neuro: no numbness, no tingling, no weakness, _    OBJECTIVE    /70   Pulse 84   Ht 5' (1.524 m)   Wt 141 lb (64 kg)   SpO2 98%   BMI 27.54 kg/m²   BP Readings from Last 4 Encounters:   03/10/22 111/70   07/25/19 120/78     Wt Readings from Last 6 Encounters:   03/10/22 141 lb (64 kg)   07/25/19 140 lb 12.8 oz (63.9 kg)       Physical examination:  General: awake alert, oriented x3, no abnormal position or movements. HEENT: normocephalic non-traumatic, no exophthalmos   Neck: supple, no LN enlargement, no thyromegaly, no thyroid tenderness, no JVD.   Pulm: Clear equal air entry no added sounds, no wheezing or rhonchi    CVS: S1 + S2, no murmur, no heave. Dorsalis pedis pulse palpable   Abd: soft lax, no tenderness, no organomegaly, audible bowel sounds. Skin: warm, no lesions, no rash. Musculoskeletal: No back tenderness, no kyphosis/scoliosis    Neuro: CN intact,  , muscle power normal  Psych: normal mood, and affect      Review of Laboratory Data:  I personally reviewed the following lab:  Lab Results   Component Value Date/Time    WBC 8.2 07/25/2017 01:25 PM    RBC 5.04 07/25/2017 01:25 PM    HGB 15.4 07/25/2017 01:25 PM    HCT 46.2 07/25/2017 01:25 PM    MCV 91.7 07/25/2017 01:25 PM    MCH 30.6 07/25/2017 01:25 PM    MCHC 33.3 07/25/2017 01:25 PM    RDW 12.2 07/25/2017 01:25 PM     07/25/2017 01:25 PM    MPV 10.4 07/25/2017 01:25 PM      Lab Results   Component Value Date/Time     07/25/2017 01:25 PM    K 4.2 07/25/2017 01:25 PM    CO2 23 07/25/2017 01:25 PM    BUN 25 (H) 07/25/2017 01:25 PM    CREATININE 0.8 07/25/2017 01:25 PM    CALCIUM 10.4 (H) 07/25/2017 01:25 PM    LABGLOM >60 07/25/2017 01:25 PM    GFRAA >60 07/25/2017 01:25 PM      Lab Results   Component Value Date/Time    TPOABS 0.5 08/05/2019 03:18 PM    THGAB <0.9 08/05/2019 03:18 PM     Lab Results   Component Value Date    LABA1C 5.6 07/25/2017    GLUCOSE 93 07/25/2017     Lab Results   Component Value Date    LABA1C 5.6 07/25/2017     No results found for: TRIG, HDL, LDLCALC, CHOL  No results found for: Steve Guerra, a 77 y.o.-old female seen in for the following issues       Assessment:      Diagnosis Orders   1. Multiple thyroid nodules  TSH    T4, Free    US THYROID   2. Vitamin D deficiency  Vitamin D 25 Hydroxy    US THYROID       Plan:     1.  Multiple thyroid nodules   Patient with history of subcentimeter left thyroid nodules  Counseled on thyroid nodules  We will repeat thyroid ultrasound  Check TSH and free T4  Further recommendations will be made after results are obtained  No compressive symptoms  No symptoms of hypo-/hyperthyroidism     2. Vitamin D deficiency   Continue vitamin D supplementation. Will assess vitamin D         I personally spent > 30 minutes reviewing  external notes from PCP and other patient's care team providers, and personally interpreted labs associated with the above diagnosis. I also ordered labs to further assess and manage the above addressed medical conditions. Return in about 1 year (around 3/10/2023). The above issues were reviewed with the patient who understood and agreed with the plan. Thank you for allowing us to participate in the care of this patient. Please do not hesitate to contact us with any additional questions. JUVE Garcia     [facility] Diabetes Care and Endocrinology   [address] [address]   Phone: [phone]  Fax: [phone]  --------------------------------------------  An electronic signature was used to authenticate this note.  El PINEDA on 3/10/2022 at 8:31 AM Warm

## 2023-03-30 ENCOUNTER — TELEPHONE (OUTPATIENT)
Dept: NEUROSURGERY | Age: 68
End: 2023-03-30

## 2023-03-30 ENCOUNTER — PREP FOR PROCEDURE (OUTPATIENT)
Dept: NEUROSURGERY | Age: 68
End: 2023-03-30

## 2023-03-30 PROBLEM — M43.16 SPONDYLOLISTHESIS OF LUMBAR REGION: Status: ACTIVE | Noted: 2023-03-30

## 2023-03-30 NOTE — TELEPHONE ENCOUNTER
Prior Authorization Form:      DEMOGRAPHICS:                     Patient Name:  Farzaneh Blas  Patient :  1955            Insurance:  Payor: Armando Rodriguesdle / Plan: [id number] / Product Type: *No Product type* /   Insurance ID Number:    Payer/Plan Subscr  Sex Relation Sub. Ins. ID Effective Group Num   1.  [employer]HC MEDICARE * Dianna THOMAS 1955 Female Self [id number] 3/7/22 [id number]                                    [address] [id number]         DIAGNOSIS & PROCEDURE:                       Procedure/Operation: L4-L5 PLIF           CPT Code: 03666, 74003, 56443, 81537, 82896, 42582, 87737    Diagnosis:  L4-L5 spindylolisthesis    ICD10 Code: M43.16    Location:  main    Surgeon:  Marcie Amezcua INFORMATION:                          Date:     Time: follow              Anesthesia:  General                                                       Status:  Outpatient        Special Comments:  Candace:  Jorge L Dumont       Electronically signed by Dianna Carmona MA on 3/30/2023 at 9:23 AM

## 2023-03-31 ENCOUNTER — PREP FOR PROCEDURE (OUTPATIENT)
Dept: NEUROSURGERY | Age: 68
End: 2023-03-31

## 2023-03-31 DIAGNOSIS — Z01.818 PRE-OP TESTING: Primary | ICD-10-CM

## 2023-03-31 RX ORDER — SODIUM CHLORIDE 0.9 % (FLUSH) 0.9 %
5-40 SYRINGE (ML) INJECTION EVERY 12 HOURS SCHEDULED
Status: CANCELLED | OUTPATIENT
Start: 2023-03-31

## 2023-03-31 RX ORDER — SODIUM CHLORIDE 0.9 % (FLUSH) 0.9 %
5-40 SYRINGE (ML) INJECTION PRN
Status: CANCELLED | OUTPATIENT
Start: 2023-03-31

## 2023-03-31 RX ORDER — SODIUM CHLORIDE 9 MG/ML
INJECTION, SOLUTION INTRAVENOUS CONTINUOUS
Status: CANCELLED | OUTPATIENT
Start: 2023-03-31

## 2023-03-31 RX ORDER — SODIUM CHLORIDE 9 MG/ML
INJECTION, SOLUTION INTRAVENOUS PRN
Status: CANCELLED | OUTPATIENT
Start: 2023-03-31

## 2023-04-05 ENCOUNTER — OFFICE VISIT (OUTPATIENT)
Dept: CARDIOLOGY CLINIC | Age: 68
End: 2023-04-05
Payer: MEDICARE

## 2023-04-05 VITALS
RESPIRATION RATE: 16 BRPM | SYSTOLIC BLOOD PRESSURE: 120 MMHG | BODY MASS INDEX: 27.72 KG/M2 | WEIGHT: 141.2 LBS | DIASTOLIC BLOOD PRESSURE: 78 MMHG | HEIGHT: 60 IN | OXYGEN SATURATION: 96 % | HEART RATE: 92 BPM

## 2023-04-05 DIAGNOSIS — M43.16 SPONDYLOLISTHESIS OF LUMBAR REGION: ICD-10-CM

## 2023-04-05 DIAGNOSIS — Z01.810 PREOP CARDIOVASCULAR EXAM: Primary | ICD-10-CM

## 2023-04-05 PROCEDURE — G8417 CALC BMI ABV UP PARAM F/U: HCPCS | Performed by: INTERNAL MEDICINE

## 2023-04-05 PROCEDURE — 93000 ELECTROCARDIOGRAM COMPLETE: CPT | Performed by: INTERNAL MEDICINE

## 2023-04-05 PROCEDURE — 99203 OFFICE O/P NEW LOW 30 MIN: CPT | Performed by: INTERNAL MEDICINE

## 2023-04-05 PROCEDURE — G8399 PT W/DXA RESULTS DOCUMENT: HCPCS | Performed by: INTERNAL MEDICINE

## 2023-04-05 PROCEDURE — G8427 DOCREV CUR MEDS BY ELIG CLIN: HCPCS | Performed by: INTERNAL MEDICINE

## 2023-04-05 PROCEDURE — 3017F COLORECTAL CA SCREEN DOC REV: CPT | Performed by: INTERNAL MEDICINE

## 2023-04-05 PROCEDURE — 1123F ACP DISCUSS/DSCN MKR DOCD: CPT | Performed by: INTERNAL MEDICINE

## 2023-04-05 PROCEDURE — 1090F PRES/ABSN URINE INCON ASSESS: CPT | Performed by: INTERNAL MEDICINE

## 2023-04-05 PROCEDURE — 1036F TOBACCO NON-USER: CPT | Performed by: INTERNAL MEDICINE

## 2023-04-05 NOTE — PROGRESS NOTES
Position: Sitting, Cuff Size: Medium Adult)   Pulse 92   Resp 16   Ht 5' (1.524 m)   Wt 141 lb 3.2 oz (64 kg)   SpO2 96%   BMI 27.58 kg/m²   Constitutional: Oriented to person, place, and time. Obese No distress. Head: Normocephalic and atraumatic. Neck: No carotid bruit, no JVD present. Cardiovascular: Normal rate, regular rhythm, normal heart sounds and intact distal pulses. No murmur heard. Pulmonary/Chest: Effort normal and breath sounds normal.    Abdominal: Soft. Bowel sounds are normal.  Musculoskeletal: No edema   Neurological: Alert and oriented to person, place, and time. Skin: Skin is warm and dry. Psychiatric: Normal mood and affect. Behavior is normal.     EKG:  normal sinus rhythm, IRBBB, there are no previous tracings available for comparison. ASSESSMENT AND PLAN:  Patient Active Problem List   Diagnosis    HLD (hyperlipidemia)    GERD (gastroesophageal reflux disease)    Spondylolisthesis of lumbar region        Patient has no high risk clinical predictors of an adverse outcome in surgery, such as recent myocardial infarction, unstable angina, heart failure, rhythm other than sinus, severe obstructive valvular disease,cva, insulin, ckd  Normal CV exam and ekg without Q waves  RCRI Class I risk (< 1% chance of cardiac complication).        Ethan Morataya M.D  [facility]

## 2023-04-12 RX ORDER — FAMOTIDINE 40 MG/1
TABLET, FILM COATED ORAL
COMMUNITY
Start: 2023-03-21

## 2023-04-12 RX ORDER — ROSUVASTATIN CALCIUM 20 MG/1
20 TABLET, COATED ORAL DAILY
COMMUNITY

## 2023-04-12 RX ORDER — ACETAMINOPHEN 325 MG/1
650 TABLET ORAL EVERY 6 HOURS PRN
COMMUNITY

## 2023-04-19 ENCOUNTER — HOSPITAL ENCOUNTER (OUTPATIENT)
Dept: GENERAL RADIOLOGY | Age: 68
Discharge: HOME | End: 2023-04-21
Payer: MEDICARE

## 2023-04-19 ENCOUNTER — HOSPITAL ENCOUNTER (OUTPATIENT)
Dept: PREADMISSION TESTING | Age: 68
Discharge: HOME | End: 2023-04-19
Payer: MEDICARE

## 2023-04-19 VITALS
BODY MASS INDEX: 26.9 KG/M2 | DIASTOLIC BLOOD PRESSURE: 68 MMHG | TEMPERATURE: 98 F | SYSTOLIC BLOOD PRESSURE: 128 MMHG | RESPIRATION RATE: 20 BRPM | OXYGEN SATURATION: 99 % | HEIGHT: 60 IN | HEART RATE: 85 BPM | WEIGHT: 137 LBS

## 2023-04-19 DIAGNOSIS — Z01.812 PRE-OPERATIVE LABORATORY EXAMINATION: ICD-10-CM

## 2023-04-19 DIAGNOSIS — Z01.818 PRE-OP TESTING: ICD-10-CM

## 2023-04-19 LAB
ABO + RH BLD: NORMAL
AMORPH SED URNS QL MICRO: ABNORMAL
ANION GAP SERPL CALCULATED.3IONS-SCNC: 13 MMOL/L (ref 7–16)
BACTERIA URNS QL MICRO: ABNORMAL /HPF
BASOPHILS # BLD: 0.03 E9/L (ref 0–0.2)
BASOPHILS NFR BLD: 0.4 % (ref 0–2)
BILIRUB UR QL STRIP: NEGATIVE
BLD GP AB SCN SERPL QL: NORMAL
BUN SERPL-MCNC: 19 MG/DL (ref 6–23)
CALCIUM SERPL-MCNC: 10.2 MG/DL (ref 8.6–10.2)
CHLORIDE SERPL-SCNC: 101 MMOL/L (ref 98–107)
CLARITY UR: NORMAL
CO2 SERPL-SCNC: 23 MMOL/L (ref 22–29)
COLOR UR: YELLOW
CREAT SERPL-MCNC: 0.9 MG/DL (ref 0.5–1)
EOSINOPHIL # BLD: 0.12 E9/L (ref 0.05–0.5)
EOSINOPHIL NFR BLD: 1.6 % (ref 0–6)
ERYTHROCYTE [DISTWIDTH] IN BLOOD BY AUTOMATED COUNT: 12.6 FL (ref 11.5–15)
GLUCOSE SERPL-MCNC: 123 MG/DL (ref 74–99)
GLUCOSE UR STRIP-MCNC: NEGATIVE MG/DL
HCT VFR BLD AUTO: 41.1 % (ref 34–48)
HGB BLD-MCNC: 14.1 G/DL (ref 11.5–15.5)
HGB UR QL STRIP: NEGATIVE
IMM GRANULOCYTES # BLD: 0.03 E9/L
IMM GRANULOCYTES NFR BLD: 0.4 % (ref 0–5)
INR BLD: 1
KETONES UR STRIP-MCNC: NEGATIVE MG/DL
LEUKOCYTE ESTERASE UR QL STRIP: NEGATIVE
LYMPHOCYTES # BLD: 2.92 E9/L (ref 1.5–4)
LYMPHOCYTES NFR BLD: 38.7 % (ref 20–42)
MCH RBC QN AUTO: 31.3 PG (ref 26–35)
MCHC RBC AUTO-ENTMCNC: 34.3 % (ref 32–34.5)
MCV RBC AUTO: 91.3 FL (ref 80–99.9)
MONOCYTES # BLD: 0.46 E9/L (ref 0.1–0.95)
MONOCYTES NFR BLD: 6.1 % (ref 2–12)
NEUTROPHILS # BLD: 3.99 E9/L (ref 1.8–7.3)
NEUTS SEG NFR BLD: 52.8 % (ref 43–80)
NITRITE UR QL STRIP: NEGATIVE
PH UR STRIP: 7.5 [PH] (ref 5–9)
PLATELET # BLD AUTO: 251 E9/L (ref 130–450)
PMV BLD AUTO: 9.8 FL (ref 7–12)
POTASSIUM SERPL-SCNC: 3.6 MMOL/L (ref 3.5–5)
PROT UR STRIP-MCNC: NEGATIVE MG/DL
PROTHROMBIN TIME: 11 SEC (ref 9.3–12.4)
RBC # BLD AUTO: 4.5 E12/L (ref 3.5–5.5)
RBC #/AREA URNS HPF: ABNORMAL /HPF (ref 0–2)
SODIUM SERPL-SCNC: 137 MMOL/L (ref 132–146)
SP GR UR STRIP: 1.01 (ref 1–1.03)
UROBILINOGEN UR STRIP-ACNC: 0.2 E.U./DL
WBC # BLD: 7.6 E9/L (ref 4.5–11.5)
WBC #/AREA URNS HPF: ABNORMAL /HPF (ref 0–5)

## 2023-04-19 PROCEDURE — 80048 BASIC METABOLIC PNL TOTAL CA: CPT

## 2023-04-19 PROCEDURE — 86850 RBC ANTIBODY SCREEN: CPT

## 2023-04-19 PROCEDURE — 81001 URINALYSIS AUTO W/SCOPE: CPT

## 2023-04-19 PROCEDURE — 36415 COLL VENOUS BLD VENIPUNCTURE: CPT

## 2023-04-19 PROCEDURE — 85025 COMPLETE CBC W/AUTO DIFF WBC: CPT

## 2023-04-19 PROCEDURE — 87081 CULTURE SCREEN ONLY: CPT

## 2023-04-19 PROCEDURE — 85610 PROTHROMBIN TIME: CPT

## 2023-04-19 PROCEDURE — 86900 BLOOD TYPING SEROLOGIC ABO: CPT

## 2023-04-19 PROCEDURE — 71046 X-RAY EXAM CHEST 2 VIEWS: CPT

## 2023-04-19 PROCEDURE — 86901 BLOOD TYPING SEROLOGIC RH(D): CPT

## 2023-04-19 PROCEDURE — 87088 URINE BACTERIA CULTURE: CPT

## 2023-04-21 LAB
BACTERIA UR CULT: NORMAL
MRSA SPEC QL CULT: NORMAL

## 2023-04-24 ENCOUNTER — ANESTHESIA EVENT (OUTPATIENT)
Dept: OPERATING ROOM | Age: 68
End: 2023-04-24
Payer: MEDICARE

## 2023-04-24 NOTE — H&P
Osbaldo Vergara (:  1955) is a 79 y.o. female,New patient, here for evaluation of the following chief complaint(s):  Back Pain (Low back pain   brought disc had injections done at pain clinic  therapy done in the past but not this time  )        ASSESSMENT/PLAN:  1. Acute midline low back pain with bilateral sciatica  79year old lady who presents with back and leg pain. Her MRI shows stenosis and spondylolisthesis at L4-L5. She has failed physical therapy and epidurals. I am recommending an L4-L5 posterior lumbar interbody fusion. Risks, benefits and alternatives of surgery have been discussed. She will think about it. She is a non-smoker     No follow-ups on file. Subjective   SUBJECTIVE/OBJECTIVE:  HPI  79year old lady who presents with back pain. The pain radiates into both legs. The pain is made worse with activity and better with rest.  She has tried physical therapy and epidural injections. The pain is rated as 7/10. She admits to numbness, tingling and weakness in her legs. She denies loss of control of bowel or bladder function. She cant walk too far. The pain is described as sharp, stabbing, burning, cramping and shooting.      Past Medical History:   Diagnosis Date    GERD (gastroesophageal reflux disease)     Hiatal hernia     HLD (hyperlipidemia)     HTN (hypertension)     Osteopenia     Spinal stenosis      Past Surgical History:   Procedure Laterality Date    BREAST REDUCTION [address] [facility] 151    FOOT SURGERY  2014    clay's neuroma    LUMBAR DISCECTOMY  2015    SINUS SURGERY      x2 ,     UPPER GASTROINTESTINAL ENDOSCOPY       Social History     Socioeconomic History    Marital status:      Spouse name: Sarita Riddle    Number of children: 2    Years of education: Not on file    Highest education level: Not on file   Occupational History    Occupation: disability     Comment:    Tobacco Use

## 2023-04-25 ENCOUNTER — HOSPITAL ENCOUNTER (INPATIENT)
Age: 68
LOS: 2 days | Discharge: HOME HEALTH | DRG: 454 | End: 2023-04-29
Attending: NEUROLOGICAL SURGERY | Admitting: NEUROLOGICAL SURGERY
Payer: MEDICARE

## 2023-04-25 ENCOUNTER — APPOINTMENT (OUTPATIENT)
Dept: GENERAL RADIOLOGY | Age: 68
DRG: 454 | End: 2023-04-25
Attending: NEUROLOGICAL SURGERY
Payer: MEDICARE

## 2023-04-25 ENCOUNTER — ANESTHESIA (OUTPATIENT)
Dept: OPERATING ROOM | Age: 68
End: 2023-04-25
Payer: MEDICARE

## 2023-04-25 DIAGNOSIS — Z01.812 PRE-OPERATIVE LABORATORY EXAMINATION: Primary | ICD-10-CM

## 2023-04-25 DIAGNOSIS — M43.16 SPONDYLOLISTHESIS OF LUMBAR REGION: ICD-10-CM

## 2023-04-25 DIAGNOSIS — M43.16 SPONDYLOLISTHESIS, LUMBAR REGION: ICD-10-CM

## 2023-04-25 LAB
ALBUMIN SERPL-MCNC: 3.8 G/DL (ref 3.5–5.2)
ALP SERPL-CCNC: 46 U/L (ref 35–104)
ALT SERPL-CCNC: 23 U/L (ref 0–32)
ANION GAP SERPL CALCULATED.3IONS-SCNC: 11 MMOL/L (ref 7–16)
AST SERPL-CCNC: 33 U/L (ref 0–31)
BASOPHILS # BLD: 0.01 E9/L (ref 0–0.2)
BASOPHILS NFR BLD: 0.1 % (ref 0–2)
BILIRUB SERPL-MCNC: 0.3 MG/DL (ref 0–1.2)
BUN SERPL-MCNC: 18 MG/DL (ref 6–23)
CALCIUM SERPL-MCNC: 8.2 MG/DL (ref 8.6–10.2)
CHLORIDE SERPL-SCNC: 108 MMOL/L (ref 98–107)
CO2 SERPL-SCNC: 19 MMOL/L (ref 22–29)
CREAT SERPL-MCNC: 0.8 MG/DL (ref 0.5–1)
EOSINOPHIL # BLD: 0 E9/L (ref 0.05–0.5)
EOSINOPHIL NFR BLD: 0 % (ref 0–6)
ERYTHROCYTE [DISTWIDTH] IN BLOOD BY AUTOMATED COUNT: 13 FL (ref 11.5–15)
GLUCOSE SERPL-MCNC: 151 MG/DL (ref 74–99)
HCT VFR BLD AUTO: 33.4 % (ref 34–48)
HGB BLD-MCNC: 11.2 G/DL (ref 11.5–15.5)
IMM GRANULOCYTES # BLD: 0.09 E9/L
IMM GRANULOCYTES NFR BLD: 0.7 % (ref 0–5)
LYMPHOCYTES # BLD: 1.07 E9/L (ref 1.5–4)
LYMPHOCYTES NFR BLD: 8.6 % (ref 20–42)
MCH RBC QN AUTO: 31 PG (ref 26–35)
MCHC RBC AUTO-ENTMCNC: 33.5 % (ref 32–34.5)
MCV RBC AUTO: 92.5 FL (ref 80–99.9)
MONOCYTES # BLD: 0.18 E9/L (ref 0.1–0.95)
MONOCYTES NFR BLD: 1.4 % (ref 2–12)
NEUTROPHILS # BLD: 11.11 E9/L (ref 1.8–7.3)
NEUTS SEG NFR BLD: 89.2 % (ref 43–80)
PLATELET # BLD AUTO: 217 E9/L (ref 130–450)
PMV BLD AUTO: 9.2 FL (ref 7–12)
POTASSIUM SERPL-SCNC: 3.6 MMOL/L (ref 3.5–5)
PROT SERPL-MCNC: 5.9 G/DL (ref 6.4–8.3)
RBC # BLD AUTO: 3.61 E12/L (ref 3.5–5.5)
SODIUM SERPL-SCNC: 138 MMOL/L (ref 132–146)
WBC # BLD: 12.5 E9/L (ref 4.5–11.5)

## 2023-04-25 PROCEDURE — 6370000000 HC RX 637 (ALT 250 FOR IP): Performed by: NEUROLOGICAL SURGERY

## 2023-04-25 PROCEDURE — 2580000003 HC RX 258: Performed by: STUDENT IN AN ORGANIZED HEALTH CARE EDUCATION/TRAINING PROGRAM

## 2023-04-25 PROCEDURE — C1713 ANCHOR/SCREW BN/BN,TIS/BN: HCPCS | Performed by: NEUROLOGICAL SURGERY

## 2023-04-25 PROCEDURE — 36415 COLL VENOUS BLD VENIPUNCTURE: CPT

## 2023-04-25 PROCEDURE — 6360000002 HC RX W HCPCS: Performed by: NEUROLOGICAL SURGERY

## 2023-04-25 PROCEDURE — 7100000001 HC PACU RECOVERY - ADDTL 15 MIN: Performed by: NEUROLOGICAL SURGERY

## 2023-04-25 PROCEDURE — 95940 IONM IN OPERATNG ROOM 15 MIN: CPT | Performed by: AUDIOLOGIST

## 2023-04-25 PROCEDURE — 0SG00AJ FUSION OF LUMBAR VERTEBRAL JOINT WITH INTERBODY FUSION DEVICE, POSTERIOR APPROACH, ANTERIOR COLUMN, OPEN APPROACH: ICD-10-PCS | Performed by: NEUROLOGICAL SURGERY

## 2023-04-25 PROCEDURE — 22840 INSERT SPINE FIXATION DEVICE: CPT | Performed by: PHYSICIAN ASSISTANT

## 2023-04-25 PROCEDURE — 22633 ARTHRD CMBN 1NTRSPC LUMBAR: CPT | Performed by: NEUROLOGICAL SURGERY

## 2023-04-25 PROCEDURE — 95908 NRV CNDJ TST 3-4 STUDIES: CPT | Performed by: AUDIOLOGIST

## 2023-04-25 PROCEDURE — 22840 INSERT SPINE FIXATION DEVICE: CPT | Performed by: NEUROLOGICAL SURGERY

## 2023-04-25 PROCEDURE — 63052 LAM FACETC/FRMT ARTHRD LUM 1: CPT | Performed by: NEUROLOGICAL SURGERY

## 2023-04-25 PROCEDURE — 7100000000 HC PACU RECOVERY - FIRST 15 MIN: Performed by: NEUROLOGICAL SURGERY

## 2023-04-25 PROCEDURE — 6360000002 HC RX W HCPCS: Performed by: NURSE ANESTHETIST, CERTIFIED REGISTERED

## 2023-04-25 PROCEDURE — 2709999900 HC NON-CHARGEABLE SUPPLY: Performed by: NEUROLOGICAL SURGERY

## 2023-04-25 PROCEDURE — 22853 INSJ BIOMECHANICAL DEVICE: CPT | Performed by: PHYSICIAN ASSISTANT

## 2023-04-25 PROCEDURE — 2580000003 HC RX 258: Performed by: NURSE ANESTHETIST, CERTIFIED REGISTERED

## 2023-04-25 PROCEDURE — 85025 COMPLETE CBC W/AUTO DIFF WBC: CPT

## 2023-04-25 PROCEDURE — 63053 LAM FACTC/FRMT ARTHRD LUM EA: CPT | Performed by: PHYSICIAN ASSISTANT

## 2023-04-25 PROCEDURE — 88304 TISSUE EXAM BY PATHOLOGIST: CPT

## 2023-04-25 PROCEDURE — G0378 HOSPITAL OBSERVATION PER HR: HCPCS

## 2023-04-25 PROCEDURE — 2720000010 HC SURG SUPPLY STERILE: Performed by: NEUROLOGICAL SURGERY

## 2023-04-25 PROCEDURE — 2700000000 HC OXYGEN THERAPY PER DAY

## 2023-04-25 PROCEDURE — 3600000005 HC SURGERY LEVEL 5 BASE: Performed by: NEUROLOGICAL SURGERY

## 2023-04-25 PROCEDURE — 80053 COMPREHEN METABOLIC PANEL: CPT

## 2023-04-25 PROCEDURE — 3700000000 HC ANESTHESIA ATTENDED CARE: Performed by: NEUROLOGICAL SURGERY

## 2023-04-25 PROCEDURE — 00NY0ZZ RELEASE LUMBAR SPINAL CORD, OPEN APPROACH: ICD-10-PCS | Performed by: NEUROLOGICAL SURGERY

## 2023-04-25 PROCEDURE — 2500000003 HC RX 250 WO HCPCS: Performed by: NURSE ANESTHETIST, CERTIFIED REGISTERED

## 2023-04-25 PROCEDURE — 3E0U0GB INTRODUCTION OF RECOMBINANT BONE MORPHOGENETIC PROTEIN INTO JOINTS, OPEN APPROACH: ICD-10-PCS | Performed by: NEUROLOGICAL SURGERY

## 2023-04-25 PROCEDURE — 22633 ARTHRD CMBN 1NTRSPC LUMBAR: CPT | Performed by: PHYSICIAN ASSISTANT

## 2023-04-25 PROCEDURE — 2580000003 HC RX 258: Performed by: NEUROLOGICAL SURGERY

## 2023-04-25 PROCEDURE — 3600000015 HC SURGERY LEVEL 5 ADDTL 15MIN: Performed by: NEUROLOGICAL SURGERY

## 2023-04-25 PROCEDURE — 63052 LAM FACETC/FRMT ARTHRD LUM 1: CPT | Performed by: PHYSICIAN ASSISTANT

## 2023-04-25 PROCEDURE — A4217 STERILE WATER/SALINE, 500 ML: HCPCS | Performed by: NEUROLOGICAL SURGERY

## 2023-04-25 PROCEDURE — 63053 LAM FACTC/FRMT ARTHRD LUM EA: CPT | Performed by: NEUROLOGICAL SURGERY

## 2023-04-25 PROCEDURE — 22853 INSJ BIOMECHANICAL DEVICE: CPT | Performed by: NEUROLOGICAL SURGERY

## 2023-04-25 PROCEDURE — 0SB20ZZ EXCISION OF LUMBAR VERTEBRAL DISC, OPEN APPROACH: ICD-10-PCS | Performed by: NEUROLOGICAL SURGERY

## 2023-04-25 PROCEDURE — 0SG0071 FUSION OF LUMBAR VERTEBRAL JOINT WITH AUTOLOGOUS TISSUE SUBSTITUTE, POSTERIOR APPROACH, POSTERIOR COLUMN, OPEN APPROACH: ICD-10-PCS | Performed by: NEUROLOGICAL SURGERY

## 2023-04-25 PROCEDURE — 3209999900 FLUORO FOR SURGICAL PROCEDURES

## 2023-04-25 PROCEDURE — C1889 IMPLANT/INSERT DEVICE, NOC: HCPCS | Performed by: NEUROLOGICAL SURGERY

## 2023-04-25 PROCEDURE — 61783 SCAN PROC SPINAL: CPT | Performed by: NEUROLOGICAL SURGERY

## 2023-04-25 PROCEDURE — 3700000001 HC ADD 15 MINUTES (ANESTHESIA): Performed by: NEUROLOGICAL SURGERY

## 2023-04-25 PROCEDURE — C1729 CATH, DRAINAGE: HCPCS | Performed by: NEUROLOGICAL SURGERY

## 2023-04-25 PROCEDURE — 2500000003 HC RX 250 WO HCPCS: Performed by: NEUROLOGICAL SURGERY

## 2023-04-25 PROCEDURE — 8E0WXBF COMPUTER ASSISTED PROCEDURE OF TRUNK REGION, WITH FLUOROSCOPY: ICD-10-PCS | Performed by: NEUROLOGICAL SURGERY

## 2023-04-25 DEVICE — THREADED LOCKING CAP, CREO
Type: IMPLANTABLE DEVICE | Site: SPINE LUMBAR | Status: FUNCTIONAL
Brand: CREO

## 2023-04-25 DEVICE — VIASORB STRP 20X50X5MM: Type: IMPLANTABLE DEVICE | Site: SPINE LUMBAR | Status: FUNCTIONAL

## 2023-04-25 DEVICE — BONE GRAFT KIT 7510100 INFUSE X SMALL
Type: IMPLANTABLE DEVICE | Site: SPINE LUMBAR | Status: FUNCTIONAL
Brand: INFUSE® BONE GRAFT

## 2023-04-25 DEVICE — SABLE SPACER, 10X22, 7-13MM, 15°
Type: IMPLANTABLE DEVICE | Site: SPINE LUMBAR | Status: FUNCTIONAL
Brand: SABLE

## 2023-04-25 DEVICE — CREO® THREADED 6.5 X 45MM POLYAXIAL SCREW
Type: IMPLANTABLE DEVICE | Site: SPINE LUMBAR | Status: FUNCTIONAL
Brand: CREO

## 2023-04-25 RX ORDER — BACITRACIN ZINC 500 [USP'U]/G
OINTMENT TOPICAL PRN
Status: DISCONTINUED | OUTPATIENT
Start: 2023-04-25 | End: 2023-04-25 | Stop reason: ALTCHOICE

## 2023-04-25 RX ORDER — BISACODYL 5 MG/1
5 TABLET, DELAYED RELEASE ORAL DAILY
Status: DISCONTINUED | OUTPATIENT
Start: 2023-04-25 | End: 2023-04-29 | Stop reason: HOSPADM

## 2023-04-25 RX ORDER — HYDROCHLOROTHIAZIDE 12.5 MG/1
12.5 TABLET ORAL DAILY
Status: DISCONTINUED | OUTPATIENT
Start: 2023-04-25 | End: 2023-04-29 | Stop reason: HOSPADM

## 2023-04-25 RX ORDER — VANCOMYCIN HYDROCHLORIDE 500 MG/10ML
INJECTION, POWDER, LYOPHILIZED, FOR SOLUTION INTRAVENOUS PRN
Status: DISCONTINUED | OUTPATIENT
Start: 2023-04-25 | End: 2023-04-25 | Stop reason: ALTCHOICE

## 2023-04-25 RX ORDER — SODIUM CHLORIDE 0.9 % (FLUSH) 0.9 %
5-40 SYRINGE (ML) INJECTION EVERY 12 HOURS SCHEDULED
Status: DISCONTINUED | OUTPATIENT
Start: 2023-04-25 | End: 2023-04-29 | Stop reason: HOSPADM

## 2023-04-25 RX ORDER — LIDOCAINE HYDROCHLORIDE AND EPINEPHRINE 5; 5 MG/ML; UG/ML
INJECTION, SOLUTION INFILTRATION; PERINEURAL PRN
Status: DISCONTINUED | OUTPATIENT
Start: 2023-04-25 | End: 2023-04-25 | Stop reason: ALTCHOICE

## 2023-04-25 RX ORDER — BISACODYL 10 MG
10 SUPPOSITORY, RECTAL RECTAL DAILY PRN
Status: DISCONTINUED | OUTPATIENT
Start: 2023-04-25 | End: 2023-04-29 | Stop reason: HOSPADM

## 2023-04-25 RX ORDER — ONDANSETRON 2 MG/ML
4 INJECTION INTRAMUSCULAR; INTRAVENOUS EVERY 6 HOURS PRN
Status: DISCONTINUED | OUTPATIENT
Start: 2023-04-25 | End: 2023-04-29 | Stop reason: HOSPADM

## 2023-04-25 RX ORDER — ROCURONIUM BROMIDE 10 MG/ML
INJECTION, SOLUTION INTRAVENOUS PRN
Status: DISCONTINUED | OUTPATIENT
Start: 2023-04-25 | End: 2023-04-25 | Stop reason: SDUPTHER

## 2023-04-25 RX ORDER — ROSUVASTATIN CALCIUM 20 MG/1
20 TABLET, COATED ORAL DAILY
Status: DISCONTINUED | OUTPATIENT
Start: 2023-04-25 | End: 2023-04-29 | Stop reason: HOSPADM

## 2023-04-25 RX ORDER — ONDANSETRON 2 MG/ML
INJECTION INTRAMUSCULAR; INTRAVENOUS PRN
Status: DISCONTINUED | OUTPATIENT
Start: 2023-04-25 | End: 2023-04-25 | Stop reason: SDUPTHER

## 2023-04-25 RX ORDER — VANCOMYCIN HYDROCHLORIDE 1 G/20ML
1000 INJECTION, POWDER, LYOPHILIZED, FOR SOLUTION INTRAVENOUS ONCE
Status: DISCONTINUED | OUTPATIENT
Start: 2023-04-25 | End: 2023-04-25 | Stop reason: SDUPTHER

## 2023-04-25 RX ORDER — DIAZEPAM 5 MG/1
5 TABLET ORAL EVERY 6 HOURS PRN
Status: DISCONTINUED | OUTPATIENT
Start: 2023-04-25 | End: 2023-04-29 | Stop reason: HOSPADM

## 2023-04-25 RX ORDER — SODIUM CHLORIDE 9 MG/ML
INJECTION, SOLUTION INTRAVENOUS CONTINUOUS PRN
Status: DISCONTINUED | OUTPATIENT
Start: 2023-04-25 | End: 2023-04-25 | Stop reason: SDUPTHER

## 2023-04-25 RX ORDER — ONDANSETRON 4 MG/1
4 TABLET, ORALLY DISINTEGRATING ORAL EVERY 8 HOURS PRN
Status: DISCONTINUED | OUTPATIENT
Start: 2023-04-25 | End: 2023-04-29 | Stop reason: HOSPADM

## 2023-04-25 RX ORDER — MEPERIDINE HYDROCHLORIDE 25 MG/ML
12.5 INJECTION INTRAMUSCULAR; INTRAVENOUS; SUBCUTANEOUS
Status: DISCONTINUED | OUTPATIENT
Start: 2023-04-25 | End: 2023-04-25 | Stop reason: HOSPADM

## 2023-04-25 RX ORDER — SODIUM CHLORIDE 9 MG/ML
INJECTION, SOLUTION INTRAVENOUS CONTINUOUS
Status: DISCONTINUED | OUTPATIENT
Start: 2023-04-25 | End: 2023-04-28

## 2023-04-25 RX ORDER — DIPHENHYDRAMINE HCL 25 MG
25 TABLET ORAL EVERY 6 HOURS PRN
Status: DISCONTINUED | OUTPATIENT
Start: 2023-04-25 | End: 2023-04-29 | Stop reason: HOSPADM

## 2023-04-25 RX ORDER — LISINOPRIL 20 MG/1
20 TABLET ORAL DAILY
Status: DISCONTINUED | OUTPATIENT
Start: 2023-04-25 | End: 2023-04-29 | Stop reason: HOSPADM

## 2023-04-25 RX ORDER — LIDOCAINE HYDROCHLORIDE 20 MG/ML
INJECTION, SOLUTION INTRAVENOUS PRN
Status: DISCONTINUED | OUTPATIENT
Start: 2023-04-25 | End: 2023-04-25 | Stop reason: SDUPTHER

## 2023-04-25 RX ORDER — SODIUM CHLORIDE 0.9 % (FLUSH) 0.9 %
5-40 SYRINGE (ML) INJECTION EVERY 12 HOURS SCHEDULED
Status: DISCONTINUED | OUTPATIENT
Start: 2023-04-25 | End: 2023-04-25 | Stop reason: HOSPADM

## 2023-04-25 RX ORDER — EPHEDRINE SULFATE/0.9% NACL/PF 50 MG/5 ML
SYRINGE (ML) INTRAVENOUS PRN
Status: DISCONTINUED | OUTPATIENT
Start: 2023-04-25 | End: 2023-04-25 | Stop reason: SDUPTHER

## 2023-04-25 RX ORDER — DEXAMETHASONE SODIUM PHOSPHATE 10 MG/ML
INJECTION INTRAMUSCULAR; INTRAVENOUS PRN
Status: DISCONTINUED | OUTPATIENT
Start: 2023-04-25 | End: 2023-04-25 | Stop reason: SDUPTHER

## 2023-04-25 RX ORDER — PROPOFOL 10 MG/ML
INJECTION, EMULSION INTRAVENOUS PRN
Status: DISCONTINUED | OUTPATIENT
Start: 2023-04-25 | End: 2023-04-25 | Stop reason: SDUPTHER

## 2023-04-25 RX ORDER — MIDAZOLAM HYDROCHLORIDE 1 MG/ML
INJECTION INTRAMUSCULAR; INTRAVENOUS PRN
Status: DISCONTINUED | OUTPATIENT
Start: 2023-04-25 | End: 2023-04-25 | Stop reason: SDUPTHER

## 2023-04-25 RX ORDER — BUPIVACAINE HYDROCHLORIDE 2.5 MG/ML
INJECTION, SOLUTION EPIDURAL; INFILTRATION; INTRACAUDAL PRN
Status: DISCONTINUED | OUTPATIENT
Start: 2023-04-25 | End: 2023-04-25 | Stop reason: ALTCHOICE

## 2023-04-25 RX ORDER — MORPHINE SULFATE 4 MG/ML
4 INJECTION, SOLUTION INTRAMUSCULAR; INTRAVENOUS
Status: DISCONTINUED | OUTPATIENT
Start: 2023-04-25 | End: 2023-04-29 | Stop reason: HOSPADM

## 2023-04-25 RX ORDER — SODIUM CHLORIDE 9 MG/ML
INJECTION, SOLUTION INTRAVENOUS PRN
Status: DISCONTINUED | OUTPATIENT
Start: 2023-04-25 | End: 2023-04-29 | Stop reason: HOSPADM

## 2023-04-25 RX ORDER — DROPERIDOL 2.5 MG/ML
INJECTION, SOLUTION INTRAMUSCULAR; INTRAVENOUS PRN
Status: DISCONTINUED | OUTPATIENT
Start: 2023-04-25 | End: 2023-04-25 | Stop reason: SDUPTHER

## 2023-04-25 RX ORDER — MORPHINE SULFATE 2 MG/ML
2 INJECTION, SOLUTION INTRAMUSCULAR; INTRAVENOUS
Status: DISCONTINUED | OUTPATIENT
Start: 2023-04-25 | End: 2023-04-29 | Stop reason: HOSPADM

## 2023-04-25 RX ORDER — SODIUM CHLORIDE 9 MG/ML
INJECTION, SOLUTION INTRAVENOUS PRN
Status: DISCONTINUED | OUTPATIENT
Start: 2023-04-25 | End: 2023-04-25 | Stop reason: HOSPADM

## 2023-04-25 RX ORDER — PROMETHAZINE HYDROCHLORIDE 25 MG/ML
6.25 INJECTION, SOLUTION INTRAMUSCULAR; INTRAVENOUS EVERY 6 HOURS PRN
Status: DISCONTINUED | OUTPATIENT
Start: 2023-04-25 | End: 2023-04-29 | Stop reason: HOSPADM

## 2023-04-25 RX ORDER — DIPHENHYDRAMINE HYDROCHLORIDE 50 MG/ML
25 INJECTION INTRAMUSCULAR; INTRAVENOUS EVERY 6 HOURS PRN
Status: DISCONTINUED | OUTPATIENT
Start: 2023-04-25 | End: 2023-04-29 | Stop reason: HOSPADM

## 2023-04-25 RX ORDER — OXYCODONE HYDROCHLORIDE 5 MG/1
5 TABLET ORAL EVERY 4 HOURS PRN
Status: DISCONTINUED | OUTPATIENT
Start: 2023-04-25 | End: 2023-04-29 | Stop reason: HOSPADM

## 2023-04-25 RX ORDER — SODIUM CHLORIDE 0.9 % (FLUSH) 0.9 %
5-40 SYRINGE (ML) INJECTION PRN
Status: DISCONTINUED | OUTPATIENT
Start: 2023-04-25 | End: 2023-04-29 | Stop reason: HOSPADM

## 2023-04-25 RX ORDER — SODIUM CHLORIDE 0.9 % (FLUSH) 0.9 %
5-40 SYRINGE (ML) INJECTION PRN
Status: DISCONTINUED | OUTPATIENT
Start: 2023-04-25 | End: 2023-04-25 | Stop reason: HOSPADM

## 2023-04-25 RX ORDER — ONDANSETRON 2 MG/ML
4 INJECTION INTRAMUSCULAR; INTRAVENOUS
Status: DISCONTINUED | OUTPATIENT
Start: 2023-04-25 | End: 2023-04-25 | Stop reason: HOSPADM

## 2023-04-25 RX ORDER — DOCUSATE SODIUM 100 MG/1
200 CAPSULE, LIQUID FILLED ORAL 2 TIMES DAILY
Status: DISCONTINUED | OUTPATIENT
Start: 2023-04-25 | End: 2023-04-29 | Stop reason: HOSPADM

## 2023-04-25 RX ORDER — ZOLPIDEM TARTRATE 5 MG/1
5 TABLET ORAL NIGHTLY PRN
Status: DISCONTINUED | OUTPATIENT
Start: 2023-04-25 | End: 2023-04-29 | Stop reason: HOSPADM

## 2023-04-25 RX ORDER — LISINOPRIL AND HYDROCHLOROTHIAZIDE 20; 12.5 MG/1; MG/1
1 TABLET ORAL DAILY
Status: DISCONTINUED | OUTPATIENT
Start: 2023-04-25 | End: 2023-04-25

## 2023-04-25 RX ORDER — SENNA AND DOCUSATE SODIUM 50; 8.6 MG/1; MG/1
1 TABLET, FILM COATED ORAL 2 TIMES DAILY
Status: DISCONTINUED | OUTPATIENT
Start: 2023-04-25 | End: 2023-04-29 | Stop reason: HOSPADM

## 2023-04-25 RX ORDER — FAMOTIDINE 20 MG/1
40 TABLET, FILM COATED ORAL DAILY
Status: DISCONTINUED | OUTPATIENT
Start: 2023-04-25 | End: 2023-04-29 | Stop reason: HOSPADM

## 2023-04-25 RX ORDER — SODIUM CHLORIDE 9 MG/ML
INJECTION, SOLUTION INTRAVENOUS CONTINUOUS
Status: DISCONTINUED | OUTPATIENT
Start: 2023-04-25 | End: 2023-04-25 | Stop reason: HOSPADM

## 2023-04-25 RX ORDER — POLYETHYLENE GLYCOL 3350 17 G/17G
17 POWDER, FOR SOLUTION ORAL DAILY
Status: DISCONTINUED | OUTPATIENT
Start: 2023-04-25 | End: 2023-04-29 | Stop reason: HOSPADM

## 2023-04-25 RX ORDER — SODIUM PHOSPHATE, DIBASIC AND SODIUM PHOSPHATE, MONOBASIC 7; 19 G/133ML; G/133ML
1 ENEMA RECTAL DAILY PRN
Status: DISCONTINUED | OUTPATIENT
Start: 2023-04-25 | End: 2023-04-29 | Stop reason: HOSPADM

## 2023-04-25 RX ORDER — FENTANYL CITRATE 50 UG/ML
INJECTION, SOLUTION INTRAMUSCULAR; INTRAVENOUS PRN
Status: DISCONTINUED | OUTPATIENT
Start: 2023-04-25 | End: 2023-04-25 | Stop reason: SDUPTHER

## 2023-04-25 RX ORDER — ACETAMINOPHEN 325 MG/1
650 TABLET ORAL EVERY 6 HOURS
Status: DISCONTINUED | OUTPATIENT
Start: 2023-04-25 | End: 2023-04-29 | Stop reason: HOSPADM

## 2023-04-25 RX ORDER — OXYCODONE HYDROCHLORIDE 10 MG/1
10 TABLET ORAL EVERY 4 HOURS PRN
Status: DISCONTINUED | OUTPATIENT
Start: 2023-04-25 | End: 2023-04-29 | Stop reason: HOSPADM

## 2023-04-25 RX ADMIN — SUGAMMADEX 100 MG: 100 INJECTION, SOLUTION INTRAVENOUS at 11:25

## 2023-04-25 RX ADMIN — TRIMETHOBENZAMIDE HYDROCHLORIDE 200 MG: 100 INJECTION INTRAMUSCULAR at 13:57

## 2023-04-25 RX ADMIN — FENTANYL CITRATE 50 MCG: 0.05 INJECTION, SOLUTION INTRAMUSCULAR; INTRAVENOUS at 09:38

## 2023-04-25 RX ADMIN — ROCURONIUM BROMIDE 50 MG: 10 INJECTION, SOLUTION INTRAVENOUS at 09:38

## 2023-04-25 RX ADMIN — DIAZEPAM 5 MG: 5 TABLET ORAL at 20:52

## 2023-04-25 RX ADMIN — Medication 5 MG: at 10:13

## 2023-04-25 RX ADMIN — ONDANSETRON 4 MG: 2 INJECTION INTRAMUSCULAR; INTRAVENOUS at 18:04

## 2023-04-25 RX ADMIN — SODIUM CHLORIDE: 9 INJECTION, SOLUTION INTRAVENOUS at 09:30

## 2023-04-25 RX ADMIN — OXYCODONE HYDROCHLORIDE 10 MG: 10 TABLET ORAL at 18:01

## 2023-04-25 RX ADMIN — SODIUM CHLORIDE: 9 INJECTION, SOLUTION INTRAVENOUS at 09:55

## 2023-04-25 RX ADMIN — FENTANYL CITRATE 50 MCG: 0.05 INJECTION, SOLUTION INTRAMUSCULAR; INTRAVENOUS at 11:09

## 2023-04-25 RX ADMIN — BISACODYL 5 MG: 5 TABLET, COATED ORAL at 17:09

## 2023-04-25 RX ADMIN — ONDANSETRON 4 MG: 2 INJECTION INTRAMUSCULAR; INTRAVENOUS at 09:45

## 2023-04-25 RX ADMIN — DOCUSATE SODIUM 200 MG: 100 CAPSULE, LIQUID FILLED ORAL at 20:44

## 2023-04-25 RX ADMIN — FAMOTIDINE 40 MG: 20 TABLET, FILM COATED ORAL at 17:09

## 2023-04-25 RX ADMIN — PHENYLEPHRINE HYDROCHLORIDE 100 MCG: 10 INJECTION INTRAVENOUS at 10:01

## 2023-04-25 RX ADMIN — SUGAMMADEX 100 MG: 100 INJECTION, SOLUTION INTRAVENOUS at 10:55

## 2023-04-25 RX ADMIN — VANCOMYCIN HYDROCHLORIDE 1000 MG: 1 INJECTION, POWDER, LYOPHILIZED, FOR SOLUTION INTRAVENOUS at 08:45

## 2023-04-25 RX ADMIN — DOCUSATE SODIUM 50 MG AND SENNOSIDES 8.6 MG 1 TABLET: 8.6; 5 TABLET, FILM COATED ORAL at 20:44

## 2023-04-25 RX ADMIN — ACETAMINOPHEN 650 MG: 325 TABLET ORAL at 17:10

## 2023-04-25 RX ADMIN — PROPOFOL 150 MG: 10 INJECTION, EMULSION INTRAVENOUS at 09:38

## 2023-04-25 RX ADMIN — DOCUSATE SODIUM 200 MG: 100 CAPSULE, LIQUID FILLED ORAL at 17:10

## 2023-04-25 RX ADMIN — SODIUM CHLORIDE, PRESERVATIVE FREE 10 ML: 5 INJECTION INTRAVENOUS at 18:04

## 2023-04-25 RX ADMIN — SODIUM CHLORIDE: 9 INJECTION, SOLUTION INTRAVENOUS at 08:22

## 2023-04-25 RX ADMIN — VANCOMYCIN HYDROCHLORIDE 1000 MG: 1 INJECTION, POWDER, LYOPHILIZED, FOR SOLUTION INTRAVENOUS at 20:42

## 2023-04-25 RX ADMIN — DROPERIDOL 0.62 MG: 2.5 INJECTION, SOLUTION INTRAMUSCULAR; INTRAVENOUS at 11:37

## 2023-04-25 RX ADMIN — DIAZEPAM 5 MG: 5 TABLET ORAL at 13:58

## 2023-04-25 RX ADMIN — MIDAZOLAM 2 MG: 1 INJECTION INTRAMUSCULAR; INTRAVENOUS at 09:30

## 2023-04-25 RX ADMIN — PHENYLEPHRINE HYDROCHLORIDE 100 MCG: 10 INJECTION INTRAVENOUS at 10:11

## 2023-04-25 RX ADMIN — OXYCODONE HYDROCHLORIDE 10 MG: 10 TABLET ORAL at 22:01

## 2023-04-25 RX ADMIN — DEXAMETHASONE SODIUM PHOSPHATE 10 MG: 10 INJECTION INTRAMUSCULAR; INTRAVENOUS at 09:45

## 2023-04-25 RX ADMIN — ROSUVASTATIN CALCIUM 20 MG: 20 TABLET, FILM COATED ORAL at 20:44

## 2023-04-25 RX ADMIN — MORPHINE SULFATE 4 MG: 4 INJECTION, SOLUTION INTRAMUSCULAR; INTRAVENOUS at 13:58

## 2023-04-25 RX ADMIN — SODIUM CHLORIDE, PRESERVATIVE FREE 10 ML: 5 INJECTION INTRAVENOUS at 20:53

## 2023-04-25 RX ADMIN — LIDOCAINE HYDROCHLORIDE 60 MG: 20 INJECTION, SOLUTION INTRAVENOUS at 09:38

## 2023-04-25 RX ADMIN — ACETAMINOPHEN 650 MG: 325 TABLET ORAL at 20:44

## 2023-04-25 ASSESSMENT — PAIN DESCRIPTION - PAIN TYPE: TYPE: SURGICAL PAIN

## 2023-04-25 ASSESSMENT — PAIN DESCRIPTION - ONSET: ONSET: ON-GOING

## 2023-04-25 ASSESSMENT — PAIN SCALES - GENERAL
PAINLEVEL_OUTOF10: 9
PAINLEVEL_OUTOF10: 10
PAINLEVEL_OUTOF10: 7
PAINLEVEL_OUTOF10: 6
PAINLEVEL_OUTOF10: 7

## 2023-04-25 ASSESSMENT — PAIN DESCRIPTION - ORIENTATION
ORIENTATION: MID;POSTERIOR
ORIENTATION: MID
ORIENTATION: MID;POSTERIOR

## 2023-04-25 ASSESSMENT — PAIN DESCRIPTION - DESCRIPTORS
DESCRIPTORS: ACHING;SHARP;THROBBING
DESCRIPTORS: THROBBING;STABBING;ACHING
DESCRIPTORS: ACHING;SORE;SHOOTING
DESCRIPTORS: ACHING;SORE;SHOOTING
DESCRIPTORS: ACHING;DISCOMFORT;SPASM

## 2023-04-25 ASSESSMENT — PAIN DESCRIPTION - LOCATION
LOCATION: BACK

## 2023-04-25 ASSESSMENT — PAIN DESCRIPTION - FREQUENCY: FREQUENCY: CONTINUOUS

## 2023-04-25 ASSESSMENT — PAIN - FUNCTIONAL ASSESSMENT
PAIN_FUNCTIONAL_ASSESSMENT: 0-10
PAIN_FUNCTIONAL_ASSESSMENT: PREVENTS OR INTERFERES SOME ACTIVE ACTIVITIES AND ADLS

## 2023-04-25 NOTE — H&P
I have examined the patient and reviewed the [place] P and no changes are noted.   Left leg is worse as it has always been    Prince Lamine MD

## 2023-04-25 NOTE — ANESTHESIA POSTPROCEDURE EVALUATION
Department of Anesthesiology  Postprocedure Note    Patient: Sherry Tena  MRN: [id number]  YOB: 1955  Date of evaluation: 4/25/2023      Procedure Summary     Date: 04/25/23 Room / Location: [facility] OR 06 / [facility]    Anesthesia Start: 0930 Anesthesia Stop: 7478    Procedure: L4-L5 POSTERIOR LUMBAR INTERBODY FUSION (Spine Lumbar) Diagnosis:       Spondylolisthesis of lumbar region      (Spondylolisthesis of lumbar region [M43.16])    Surgeons: Stefany Godwin MD Responsible Provider: Sandra Olivier DO    Anesthesia Type: general ASA Status: 3          Anesthesia Type: No value filed.     Estefania Phase I: Estefania Score: 8    Estefania Phase II:        Anesthesia Post Evaluation    Patient location during evaluation: PACU  Patient participation: complete - patient participated  Level of consciousness: awake and alert  Airway patency: patent  Nausea & Vomiting: no nausea and no vomiting  Complications: no  Cardiovascular status: blood pressure returned to baseline  Respiratory status: acceptable  Hydration status: euvolemic  Multimodal analgesia pain management approach

## 2023-04-25 NOTE — ANESTHESIA PRE PROCEDURE
Department of Anesthesiology  Preprocedure Note       Name:  Adam Brandt   Age:  76 y.o.  :  1955                                          MRN:  [id number]         Date:  2023      Surgeon: Geoffrey Guzmán):  Johan Amato MD    Procedure: Procedure(s):  L4-L5 PLIF/ o-arm, c-arm, devin table, cell saver, platelet gel, cages and posterior instrumentation, Audiology, Globus    Medications prior to admission:   Prior to Admission medications    Medication Sig Start Date End Date Taking?  Authorizing Provider   famotidine (PEPCID) 40 MG tablet  3/21/23   Historical Provider, MD   rosuvastatin (CRESTOR) 20 MG tablet Take 1 tablet by mouth daily    Historical Provider, MD   acetaminophen (TYLENOL) 325 MG tablet Take 2 tablets by mouth every 6 hours as needed for Pain    Historical Provider, MD   NONFORMULARY Take 1,000 mg by mouth 3 times daily Red marine Calcium with Magnesium    Historical Provider, MD   fluticasone (FLONASE) 50 MCG/ACT nasal spray 1-2 sprays by Nasal route as needed 3/18/22   Historical Provider, MD   Cholecalciferol (VITAMIN D3) 5000 units TABS Take 0.5 tablets by mouth every other day    Historical Provider, MD   lisinopril-hydrochlorothiazide (PRINZIDE;ZESTORETIC) 20-12.5 MG per tablet daily  19   Historical Provider, MD   ibuprofen (ADVIL;MOTRIN) 800 MG tablet 1 tablet 2 times daily 19   Historical Provider, MD   zolpidem (AMBIEN) 5 MG tablet TAKE 1 TABLET BY MOUTH EVERY DAY AT BEDTIME AS NEEDED 19   Historical Provider, MD       Current medications:    Current Facility-Administered Medications   Medication Dose Route Frequency Provider Last Rate Last Admin    sodium chloride flush 0.9 % injection 5-40 mL  5-40 mL IntraVENous 2 times per day Jennifer Montanez        sodium chloride flush 0.9 % injection 5-40 mL  5-40 mL IntraVENous PRN VANI Montanez        0.9 % sodium chloride infusion   IntraVENous PRN VANI Montanez 25 mL/hr at 23 2689 New Bag at

## 2023-04-26 LAB
ANION GAP SERPL CALCULATED.3IONS-SCNC: 13 MMOL/L (ref 7–16)
BUN SERPL-MCNC: 16 MG/DL (ref 6–23)
CALCIUM SERPL-MCNC: 8.3 MG/DL (ref 8.6–10.2)
CHLORIDE SERPL-SCNC: 113 MMOL/L (ref 98–107)
CO2 SERPL-SCNC: 18 MMOL/L (ref 22–29)
CREAT SERPL-MCNC: 0.8 MG/DL (ref 0.5–1)
ERYTHROCYTE [DISTWIDTH] IN BLOOD BY AUTOMATED COUNT: 13.2 FL (ref 11.5–15)
GLUCOSE SERPL-MCNC: 134 MG/DL (ref 74–99)
HCT VFR BLD AUTO: 31.6 % (ref 34–48)
HGB BLD-MCNC: 10.9 G/DL (ref 11.5–15.5)
MCH RBC QN AUTO: 32 PG (ref 26–35)
MCHC RBC AUTO-ENTMCNC: 34.5 % (ref 32–34.5)
MCV RBC AUTO: 92.7 FL (ref 80–99.9)
PLATELET # BLD AUTO: 217 E9/L (ref 130–450)
PMV BLD AUTO: 9.4 FL (ref 7–12)
POTASSIUM SERPL-SCNC: 3.8 MMOL/L (ref 3.5–5)
RBC # BLD AUTO: 3.41 E12/L (ref 3.5–5.5)
SODIUM SERPL-SCNC: 144 MMOL/L (ref 132–146)
WBC # BLD: 14.5 E9/L (ref 4.5–11.5)

## 2023-04-26 PROCEDURE — 97165 OT EVAL LOW COMPLEX 30 MIN: CPT

## 2023-04-26 PROCEDURE — 80048 BASIC METABOLIC PNL TOTAL CA: CPT

## 2023-04-26 PROCEDURE — 96374 THER/PROPH/DIAG INJ IV PUSH: CPT

## 2023-04-26 PROCEDURE — 97161 PT EVAL LOW COMPLEX 20 MIN: CPT

## 2023-04-26 PROCEDURE — 97535 SELF CARE MNGMENT TRAINING: CPT

## 2023-04-26 PROCEDURE — G0378 HOSPITAL OBSERVATION PER HR: HCPCS

## 2023-04-26 PROCEDURE — 6360000002 HC RX W HCPCS: Performed by: NEUROLOGICAL SURGERY

## 2023-04-26 PROCEDURE — 85027 COMPLETE CBC AUTOMATED: CPT

## 2023-04-26 PROCEDURE — 6370000000 HC RX 637 (ALT 250 FOR IP): Performed by: NEUROLOGICAL SURGERY

## 2023-04-26 PROCEDURE — 97530 THERAPEUTIC ACTIVITIES: CPT

## 2023-04-26 PROCEDURE — 36415 COLL VENOUS BLD VENIPUNCTURE: CPT

## 2023-04-26 PROCEDURE — 2580000003 HC RX 258: Performed by: NEUROLOGICAL SURGERY

## 2023-04-26 RX ADMIN — FAMOTIDINE 40 MG: 20 TABLET, FILM COATED ORAL at 10:40

## 2023-04-26 RX ADMIN — OXYCODONE HYDROCHLORIDE 10 MG: 10 TABLET ORAL at 06:06

## 2023-04-26 RX ADMIN — ACETAMINOPHEN 650 MG: 325 TABLET ORAL at 21:00

## 2023-04-26 RX ADMIN — VANCOMYCIN HYDROCHLORIDE 1000 MG: 1 INJECTION, POWDER, LYOPHILIZED, FOR SOLUTION INTRAVENOUS at 20:59

## 2023-04-26 RX ADMIN — ACETAMINOPHEN 650 MG: 325 TABLET ORAL at 15:04

## 2023-04-26 RX ADMIN — DOCUSATE SODIUM 200 MG: 100 CAPSULE, LIQUID FILLED ORAL at 21:00

## 2023-04-26 RX ADMIN — OXYCODONE HYDROCHLORIDE 10 MG: 10 TABLET ORAL at 22:17

## 2023-04-26 RX ADMIN — Medication 1 DROP: at 12:02

## 2023-04-26 RX ADMIN — VANCOMYCIN HYDROCHLORIDE 1000 MG: 1 INJECTION, POWDER, LYOPHILIZED, FOR SOLUTION INTRAVENOUS at 11:03

## 2023-04-26 RX ADMIN — MORPHINE SULFATE 4 MG: 4 INJECTION, SOLUTION INTRAMUSCULAR; INTRAVENOUS at 01:45

## 2023-04-26 RX ADMIN — OXYCODONE HYDROCHLORIDE 10 MG: 10 TABLET ORAL at 18:04

## 2023-04-26 RX ADMIN — ACETAMINOPHEN 650 MG: 325 TABLET ORAL at 01:44

## 2023-04-26 RX ADMIN — ROSUVASTATIN CALCIUM 20 MG: 20 TABLET, FILM COATED ORAL at 21:00

## 2023-04-26 RX ADMIN — DOCUSATE SODIUM 50 MG AND SENNOSIDES 8.6 MG 1 TABLET: 8.6; 5 TABLET, FILM COATED ORAL at 21:00

## 2023-04-26 RX ADMIN — SODIUM CHLORIDE, PRESERVATIVE FREE 10 ML: 5 INJECTION INTRAVENOUS at 10:39

## 2023-04-26 RX ADMIN — DOCUSATE SODIUM 50 MG AND SENNOSIDES 8.6 MG 1 TABLET: 8.6; 5 TABLET, FILM COATED ORAL at 10:40

## 2023-04-26 RX ADMIN — ACETAMINOPHEN 650 MG: 325 TABLET ORAL at 09:09

## 2023-04-26 RX ADMIN — ONDANSETRON 4 MG: 4 TABLET, ORALLY DISINTEGRATING ORAL at 22:40

## 2023-04-26 RX ADMIN — DIAZEPAM 5 MG: 5 TABLET ORAL at 06:06

## 2023-04-26 RX ADMIN — DOCUSATE SODIUM 200 MG: 100 CAPSULE, LIQUID FILLED ORAL at 10:40

## 2023-04-26 RX ADMIN — OXYCODONE HYDROCHLORIDE 10 MG: 10 TABLET ORAL at 10:49

## 2023-04-26 RX ADMIN — ONDANSETRON 4 MG: 4 TABLET, ORALLY DISINTEGRATING ORAL at 09:11

## 2023-04-26 RX ADMIN — POLYETHYLENE GLYCOL 3350 17 G: 17 POWDER, FOR SOLUTION ORAL at 10:39

## 2023-04-26 RX ADMIN — BISACODYL 5 MG: 5 TABLET, COATED ORAL at 10:39

## 2023-04-26 ASSESSMENT — PAIN DESCRIPTION - DESCRIPTORS
DESCRIPTORS: ACHING;SORE
DESCRIPTORS: DISCOMFORT;DULL;ACHING
DESCRIPTORS: ACHING;DISCOMFORT;DULL
DESCRIPTORS: ACHING;DISCOMFORT;DULL;SHARP
DESCRIPTORS: ACHING;SORE;SHOOTING
DESCRIPTORS: SORE;TENDER

## 2023-04-26 ASSESSMENT — PAIN DESCRIPTION - PAIN TYPE
TYPE: SURGICAL PAIN

## 2023-04-26 ASSESSMENT — PAIN DESCRIPTION - ONSET
ONSET: ON-GOING

## 2023-04-26 ASSESSMENT — PAIN SCALES - GENERAL
PAINLEVEL_OUTOF10: 7
PAINLEVEL_OUTOF10: 4
PAINLEVEL_OUTOF10: 5
PAINLEVEL_OUTOF10: 7
PAINLEVEL_OUTOF10: 8
PAINLEVEL_OUTOF10: 7
PAINLEVEL_OUTOF10: 8
PAINLEVEL_OUTOF10: 6
PAINLEVEL_OUTOF10: 5
PAINLEVEL_OUTOF10: 3
PAINLEVEL_OUTOF10: 5
PAINLEVEL_OUTOF10: 5

## 2023-04-26 ASSESSMENT — PAIN DESCRIPTION - LOCATION
LOCATION: BACK
LOCATION: BACK;GROIN;LEG
LOCATION: BACK
LOCATION: THROAT

## 2023-04-26 ASSESSMENT — PAIN DESCRIPTION - ORIENTATION
ORIENTATION: POSTERIOR
ORIENTATION: LOWER;POSTERIOR

## 2023-04-26 ASSESSMENT — PAIN DESCRIPTION - FREQUENCY
FREQUENCY: CONTINUOUS

## 2023-04-26 NOTE — PLAN OF CARE
Problem: Pain  Goal: Verbalizes/displays adequate comfort level or baseline comfort level  4/26/2023 0415 by Portia Sunshine RN  Outcome: Progressing  4/25/2023 1425 by Arvind Cortez RN  Outcome: Progressing

## 2023-04-26 NOTE — CONSULTS
never smoked. She has never used smokeless tobacco.  ETOH:   reports that she does not currently use alcohol. MARITAL STATUS:    OCCUPATION:      Family History:       Problem Relation Age of Onset    Cancer Mother         blood    Hypothyroidism Mother     Cancer Father         blood    No Known Problems Sister     No Known Problems Daughter     Other Daughter         hyperparathyroidism       REVIEW OF SYSTEMS:    General ROS: negative  Hematological and Lymphatic ROS: negative  Endocrine ROS: negative  Respiratory ROS: no cough, shortness of breath, or wheezing  Cardiovascular ROS: no chest pain or dyspnea on exertion  Gastrointestinal ROS: no abdominal pain, change in bowel habits, or black or bloody stools  Genito-Urinary ROS: no dysuria, trouble voiding, or hematuria  Neurological ROS: no TIA or stroke symptoms  negative    Vitals:  BP (!) 118/58   Pulse 77   Temp 98.1 °F (36.7 °C) (Temporal)   Resp 16   Ht 5' 2\" (1.575 m)   Wt 137 lb (62.1 kg)   SpO2 96%   BMI 25.06 kg/m²     PHYSICAL EXAM:  General:  Awake, alert, oriented X 3. Well developed, well nourished, well groomed. No apparent distress. HEENT:  Normocephalic, atraumatic. Pupils equal, round, reactive to light. No scleral icterus. No conjunctival injection. Normal lips, teeth, and gums. No nasal discharge. Neck:  Supple, FROM  Heart:  RRR, no murmurs, gallops, rubs, carotid upstroke normal, no carotid bruits  Lungs:  CTA bilaterally, bilat symmetrical expansion, no wheeze, rales, or rhonchi  Abdomen:   Bowel sounds present, soft, nontender, no masses, no organomegaly, no peritoneal signs, accordion drain  Extremities:  No clubbing, cyanosis, or edema  Skin: Surgical incision with drain to lumbar region  Neuro:  Cranial nerves 2-12 intact, no focal deficits  Vascular: Radial and pedal Pulses 2+  Breast: deferred  Rectal: deferred  Genitalia:  deferred      DATA:     Recent Labs     04/25/23 2127 04/26/23  0619   WBC 12.5* 14.5*   HGB

## 2023-04-26 NOTE — PLAN OF CARE
Problem: Discharge Planning  Goal: Discharge to home or other facility with appropriate resources  Outcome: Progressing     Problem: Pain  Goal: Verbalizes/displays adequate comfort level or baseline comfort level  Outcome: Completed     Problem: Safety - Adult  Goal: Free from fall injury  Outcome: Completed

## 2023-04-26 NOTE — OP NOTE
[address]                  [address] [facility],  [facility]                                OPERATIVE REPORT    PATIENT NAME: Sudhir Bellamy                   :        1955  MED REC NO:   [id number]                            ROOM:       8210  ACCOUNT NO:   [de-identified]                           ADMIT DATE: 2023  PROVIDER:     Katalina Bass MD    DATE OF PROCEDURE:  2023    PREOPERATIVE DIAGNOSIS:  L4-L5 degenerative spondylolisthesis. POSTOPERATIVE DIAGNOSIS:  L4-L5 degenerative spondylolisthesis. OPERATIVE PROCEDURES:  1.  Bilateral segmental arthrodesis and fusion at L4-L5 with the use of  bilateral L4 and L5 pedicle screws with use of locally harvested  autograft plus allograft in the form of BioZorb strips for  posterolateral fusion at L4-L5. 2.  360-degree fusion with posterior lumbar interbody fusion at L4-L5  with use of bilateral Globus stable expandable cages packed with  recombinant BMP-2 (Infuse). 3.  Bilateral L4 and L5 laminectomy, bilateral L4-L5 medial facetectomy,  bilateral L4 and L5 foraminotomy and bilateral L4-L5 diskectomy. 4.  Use of O-arm assisted navigation with placement of pedicle screws. 5.  Use of free-running EMG to test pedicle screw. 6.  Use of intraoperative fluoroscopy interpreted by myself, the  surgeon. 7.  AS modifier for Sam Davis PA-C, who assisted with primary  exposure and primary closure. ANESTHESIA:  Generalized endotracheal anesthesia. SURGEON:  Katalina Bass MD    ASSISTANT:  Sam Davis PA-C    COMPLICATIONS:  None. ESTIMATED BLOOD LOSS:  100 mL. SPECIMENS:  Disk. OPERATIVE INDICATIONS:  The patient is a 59-year-old lady who presented  to the office complaining of back pain that is radiated into her legs. She had an MRI that showed that she had spondylolisthesis at L4-L5.   She  has failed conservative therapy and after risks, benefits, and  alternatives were

## 2023-04-26 NOTE — CARE COORDINATION
04/26/23 Transition of Care: Patient is observation POD 1 Lumbar Fusion. Brace at the bedside. She is on iv vanc q12/iv flds. She has drain and ashraf. Ashraf to be removed today. ARU is following. Patient is alert and oriented. She lives with her  in a one level home with two steps. She does have safety rail. She has a rollator at home. She follows with Dr Tanner Mensah and her pharmacy is [facility]mail order. PT and OT 14/24. She is being followed by [address] [place] [address] for plan at discharge. Will follow. Electronically signed by Young Rodrigues RN CM on 4/26/2023 at 11:16 AM     Case Management Assessment  Initial Evaluation    Date/Time of Evaluation: 4/26/2023 11:18 AM  Assessment Completed by: Young Rodrigues RN    If patient is discharged prior to next notation, then this note serves as note for discharge by case management. Patient Name: Casey Franklin                   YOB: 1955  Diagnosis: Spondylolisthesis of lumbar region [M43.16]                   Date / Time: 4/25/2023  7:51 AM    Patient Admission Status: Observation   Readmission Risk (Low < 19, Mod (19-27), High > 27): No data recorded  Current PCP: Ancelmo Verde, DO  PCP verified by ? Yes    Chart Reviewed: Yes      History Provided by: Patient  Patient Orientation: Alert and Oriented    Patient Cognition: Alert    Hospitalization in the last 30 days (Readmission):  No    If yes, Readmission Assessment in CM Navigator will be completed.     Advance Directives:      Code Status: Full Code   Patient's Primary Decision Maker is: Legal Next of Kin      Discharge Planning:    Patient lives with: Spouse/Significant Other Type of Home: House  Primary Care Giver: Self  Patient Support Systems include: Spouse/Significant Other   Current Financial resources:    Current community resources:    Current services prior to admission: None            Current DME:              Type of Home Care services:  None    ADLS  Prior

## 2023-04-27 PROBLEM — M43.16 SPONDYLOLISTHESIS, LUMBAR REGION: Status: ACTIVE | Noted: 2023-04-27

## 2023-04-27 LAB
ERYTHROCYTE [DISTWIDTH] IN BLOOD BY AUTOMATED COUNT: 13.9 FL (ref 11.5–15)
HCT VFR BLD AUTO: 30.1 % (ref 34–48)
HGB BLD-MCNC: 9.8 G/DL (ref 11.5–15.5)
MCH RBC QN AUTO: 31 PG (ref 26–35)
MCHC RBC AUTO-ENTMCNC: 32.6 % (ref 32–34.5)
MCV RBC AUTO: 95.3 FL (ref 80–99.9)
PLATELET # BLD AUTO: 187 E9/L (ref 130–450)
PMV BLD AUTO: 9.5 FL (ref 7–12)
RBC # BLD AUTO: 3.16 E12/L (ref 3.5–5.5)
WBC # BLD: 11.6 E9/L (ref 4.5–11.5)

## 2023-04-27 PROCEDURE — 97530 THERAPEUTIC ACTIVITIES: CPT

## 2023-04-27 PROCEDURE — 6360000002 HC RX W HCPCS: Performed by: NEUROLOGICAL SURGERY

## 2023-04-27 PROCEDURE — G0378 HOSPITAL OBSERVATION PER HR: HCPCS

## 2023-04-27 PROCEDURE — 1200000000 HC SEMI PRIVATE

## 2023-04-27 PROCEDURE — 97535 SELF CARE MNGMENT TRAINING: CPT

## 2023-04-27 PROCEDURE — 85027 COMPLETE CBC AUTOMATED: CPT

## 2023-04-27 PROCEDURE — 36415 COLL VENOUS BLD VENIPUNCTURE: CPT

## 2023-04-27 PROCEDURE — 6370000000 HC RX 637 (ALT 250 FOR IP): Performed by: NEUROLOGICAL SURGERY

## 2023-04-27 PROCEDURE — 2580000003 HC RX 258: Performed by: NEUROLOGICAL SURGERY

## 2023-04-27 RX ADMIN — VANCOMYCIN HYDROCHLORIDE 1000 MG: 1 INJECTION, POWDER, LYOPHILIZED, FOR SOLUTION INTRAVENOUS at 09:42

## 2023-04-27 RX ADMIN — OXYCODONE HYDROCHLORIDE 10 MG: 10 TABLET ORAL at 09:31

## 2023-04-27 RX ADMIN — OXYCODONE HYDROCHLORIDE 10 MG: 10 TABLET ORAL at 05:59

## 2023-04-27 RX ADMIN — ACETAMINOPHEN 650 MG: 325 TABLET ORAL at 20:30

## 2023-04-27 RX ADMIN — SODIUM CHLORIDE, PRESERVATIVE FREE 10 ML: 5 INJECTION INTRAVENOUS at 09:32

## 2023-04-27 RX ADMIN — DOCUSATE SODIUM 50 MG AND SENNOSIDES 8.6 MG 1 TABLET: 8.6; 5 TABLET, FILM COATED ORAL at 09:30

## 2023-04-27 RX ADMIN — DOCUSATE SODIUM 50 MG AND SENNOSIDES 8.6 MG 1 TABLET: 8.6; 5 TABLET, FILM COATED ORAL at 20:30

## 2023-04-27 RX ADMIN — DOCUSATE SODIUM 200 MG: 100 CAPSULE, LIQUID FILLED ORAL at 09:30

## 2023-04-27 RX ADMIN — VANCOMYCIN HYDROCHLORIDE 1000 MG: 1 INJECTION, POWDER, LYOPHILIZED, FOR SOLUTION INTRAVENOUS at 20:31

## 2023-04-27 RX ADMIN — BISACODYL 5 MG: 5 TABLET, COATED ORAL at 09:31

## 2023-04-27 RX ADMIN — ONDANSETRON 4 MG: 4 TABLET, ORALLY DISINTEGRATING ORAL at 14:48

## 2023-04-27 RX ADMIN — FAMOTIDINE 40 MG: 20 TABLET, FILM COATED ORAL at 09:32

## 2023-04-27 RX ADMIN — DIPHENHYDRAMINE HYDROCHLORIDE 25 MG: 25 TABLET ORAL at 20:30

## 2023-04-27 RX ADMIN — ACETAMINOPHEN 650 MG: 325 TABLET ORAL at 09:31

## 2023-04-27 RX ADMIN — POLYETHYLENE GLYCOL 3350 17 G: 17 POWDER, FOR SOLUTION ORAL at 09:30

## 2023-04-27 RX ADMIN — ACETAMINOPHEN 650 MG: 325 TABLET ORAL at 15:05

## 2023-04-27 RX ADMIN — OXYCODONE 5 MG: 5 TABLET ORAL at 20:29

## 2023-04-27 RX ADMIN — ROSUVASTATIN CALCIUM 20 MG: 20 TABLET, FILM COATED ORAL at 20:30

## 2023-04-27 RX ADMIN — DOCUSATE SODIUM 200 MG: 100 CAPSULE, LIQUID FILLED ORAL at 20:30

## 2023-04-27 ASSESSMENT — PAIN DESCRIPTION - ORIENTATION
ORIENTATION: LOWER;POSTERIOR
ORIENTATION: LOWER;POSTERIOR

## 2023-04-27 ASSESSMENT — PAIN DESCRIPTION - LOCATION
LOCATION: BACK

## 2023-04-27 ASSESSMENT — PAIN - FUNCTIONAL ASSESSMENT
PAIN_FUNCTIONAL_ASSESSMENT: PREVENTS OR INTERFERES SOME ACTIVE ACTIVITIES AND ADLS
PAIN_FUNCTIONAL_ASSESSMENT: PREVENTS OR INTERFERES SOME ACTIVE ACTIVITIES AND ADLS

## 2023-04-27 ASSESSMENT — PAIN DESCRIPTION - DESCRIPTORS
DESCRIPTORS: ACHING;THROBBING;SORE
DESCRIPTORS: ACHING;DULL;DISCOMFORT
DESCRIPTORS: SHARP;STABBING;DISCOMFORT;ACHING

## 2023-04-27 ASSESSMENT — PAIN SCALES - GENERAL
PAINLEVEL_OUTOF10: 5
PAINLEVEL_OUTOF10: 8
PAINLEVEL_OUTOF10: 7
PAINLEVEL_OUTOF10: 6
PAINLEVEL_OUTOF10: 7

## 2023-04-27 NOTE — CARE COORDINATION
04/27/23 Update CM Note: POD 2 lumbar fusion. Patient doing well with good pain control. She remains on iv vanc bid, iv flds. PT 14/OT 14/24. She was able to ambulate 25ft with fww with mod cues. She is currently sitting on side of the bed. Drain will be out tomorrow. Patient is being followed by ARU but is hoping to return home at discharge. Home care is ordered. Referral called to Fiona Miramontes at [facility]. Will continue to follow. Electronically signed by Daphney Bautista RN CM on 4/27/2023 at 10:16 AM    The Plan for Transition of Care is related to the following treatment goals: homecare    The Patient and/or patient representative  was provided with a choice of provider and agrees   with the discharge plan. [x] Yes [] No    Freedom of choice list was provided with basic dialogue that supports the patient's individualized plan of care/goals, treatment preferences and shares the quality data associated with the providers.  [x] Yes [] No

## 2023-04-27 NOTE — PLAN OF CARE
Problem: Discharge Planning  Goal: Discharge to home or other facility with appropriate resources  4/27/2023 0111 by Yazmin Mendes RN  Outcome: Progressing  4/26/2023 1934 by Damian Delarosa RN  Outcome: Progressing     Problem: Pain  Goal: Verbalizes/displays adequate comfort level or baseline comfort level  4/26/2023 1934 by Damian Delarosa RN  Outcome: Completed

## 2023-04-27 NOTE — CARE COORDINATION
Spoke with patient at the bedside to see if she had thought more about coming to ARU. Patient adamantly would like to return home at discharge and not come to ARU.

## 2023-04-28 LAB
ALBUMIN SERPL-MCNC: 2.8 G/DL (ref 3.5–5.2)
ALP SERPL-CCNC: 46 U/L (ref 35–104)
ALT SERPL-CCNC: 16 U/L (ref 0–32)
ANION GAP SERPL CALCULATED.3IONS-SCNC: 9 MMOL/L (ref 7–16)
AST SERPL-CCNC: 23 U/L (ref 0–31)
BASOPHILS # BLD: 0.03 E9/L (ref 0–0.2)
BASOPHILS NFR BLD: 0.3 % (ref 0–2)
BILIRUB SERPL-MCNC: 0.3 MG/DL (ref 0–1.2)
BUN SERPL-MCNC: 10 MG/DL (ref 6–23)
CALCIUM SERPL-MCNC: 8 MG/DL (ref 8.6–10.2)
CHLORIDE SERPL-SCNC: 109 MMOL/L (ref 98–107)
CO2 SERPL-SCNC: 22 MMOL/L (ref 22–29)
CREAT SERPL-MCNC: 0.8 MG/DL (ref 0.5–1)
EOSINOPHIL # BLD: 0.12 E9/L (ref 0.05–0.5)
EOSINOPHIL NFR BLD: 1.1 % (ref 0–6)
ERYTHROCYTE [DISTWIDTH] IN BLOOD BY AUTOMATED COUNT: 14.2 FL (ref 11.5–15)
GLUCOSE SERPL-MCNC: 105 MG/DL (ref 74–99)
HCT VFR BLD AUTO: 29.2 % (ref 34–48)
HGB BLD-MCNC: 9.5 G/DL (ref 11.5–15.5)
IMM GRANULOCYTES # BLD: 0.08 E9/L
IMM GRANULOCYTES NFR BLD: 0.7 % (ref 0–5)
LYMPHOCYTES # BLD: 2.83 E9/L (ref 1.5–4)
LYMPHOCYTES NFR BLD: 26.5 % (ref 20–42)
MCH RBC QN AUTO: 31.9 PG (ref 26–35)
MCHC RBC AUTO-ENTMCNC: 32.5 % (ref 32–34.5)
MCV RBC AUTO: 98 FL (ref 80–99.9)
MONOCYTES # BLD: 0.84 E9/L (ref 0.1–0.95)
MONOCYTES NFR BLD: 7.9 % (ref 2–12)
NEUTROPHILS # BLD: 6.78 E9/L (ref 1.8–7.3)
NEUTS SEG NFR BLD: 63.5 % (ref 43–80)
PLATELET # BLD AUTO: 151 E9/L (ref 130–450)
PMV BLD AUTO: 10.6 FL (ref 7–12)
POTASSIUM SERPL-SCNC: 3.3 MMOL/L (ref 3.5–5)
PROT SERPL-MCNC: 5.1 G/DL (ref 6.4–8.3)
RBC # BLD AUTO: 2.98 E12/L (ref 3.5–5.5)
SODIUM SERPL-SCNC: 140 MMOL/L (ref 132–146)
WBC # BLD: 10.7 E9/L (ref 4.5–11.5)

## 2023-04-28 PROCEDURE — G0378 HOSPITAL OBSERVATION PER HR: HCPCS

## 2023-04-28 PROCEDURE — 1200000000 HC SEMI PRIVATE

## 2023-04-28 PROCEDURE — 6360000002 HC RX W HCPCS: Performed by: NEUROLOGICAL SURGERY

## 2023-04-28 PROCEDURE — 2580000003 HC RX 258: Performed by: FAMILY MEDICINE

## 2023-04-28 PROCEDURE — 97530 THERAPEUTIC ACTIVITIES: CPT

## 2023-04-28 PROCEDURE — 85025 COMPLETE CBC W/AUTO DIFF WBC: CPT

## 2023-04-28 PROCEDURE — 80053 COMPREHEN METABOLIC PANEL: CPT

## 2023-04-28 PROCEDURE — 6370000000 HC RX 637 (ALT 250 FOR IP): Performed by: NEUROLOGICAL SURGERY

## 2023-04-28 PROCEDURE — 36415 COLL VENOUS BLD VENIPUNCTURE: CPT

## 2023-04-28 PROCEDURE — 97535 SELF CARE MNGMENT TRAINING: CPT

## 2023-04-28 PROCEDURE — 2580000003 HC RX 258: Performed by: NEUROLOGICAL SURGERY

## 2023-04-28 RX ORDER — SODIUM CHLORIDE, SODIUM LACTATE, POTASSIUM CHLORIDE, AND CALCIUM CHLORIDE .6; .31; .03; .02 G/100ML; G/100ML; G/100ML; G/100ML
500 INJECTION, SOLUTION INTRAVENOUS ONCE
Status: COMPLETED | OUTPATIENT
Start: 2023-04-28 | End: 2023-04-28

## 2023-04-28 RX ORDER — OXYCODONE HYDROCHLORIDE 5 MG/1
5 TABLET ORAL EVERY 4 HOURS PRN
Qty: 42 TABLET | Refills: 0 | Status: SHIPPED | OUTPATIENT
Start: 2023-04-28 | End: 2023-05-05

## 2023-04-28 RX ORDER — SODIUM CHLORIDE, SODIUM LACTATE, POTASSIUM CHLORIDE, CALCIUM CHLORIDE 600; 310; 30; 20 MG/100ML; MG/100ML; MG/100ML; MG/100ML
INJECTION, SOLUTION INTRAVENOUS CONTINUOUS
Status: DISCONTINUED | OUTPATIENT
Start: 2023-04-28 | End: 2023-04-29 | Stop reason: HOSPADM

## 2023-04-28 RX ORDER — DIAZEPAM 5 MG/1
5 TABLET ORAL EVERY 6 HOURS PRN
Qty: 40 TABLET | Refills: 0 | Status: SHIPPED | OUTPATIENT
Start: 2023-04-28 | End: 2023-05-08

## 2023-04-28 RX ADMIN — ACETAMINOPHEN 650 MG: 325 TABLET ORAL at 13:53

## 2023-04-28 RX ADMIN — DOCUSATE SODIUM 200 MG: 100 CAPSULE, LIQUID FILLED ORAL at 20:17

## 2023-04-28 RX ADMIN — ACETAMINOPHEN 650 MG: 325 TABLET ORAL at 04:29

## 2023-04-28 RX ADMIN — ROSUVASTATIN CALCIUM 20 MG: 20 TABLET, FILM COATED ORAL at 20:16

## 2023-04-28 RX ADMIN — SODIUM CHLORIDE, PRESERVATIVE FREE 10 ML: 5 INJECTION INTRAVENOUS at 09:11

## 2023-04-28 RX ADMIN — ACETAMINOPHEN 650 MG: 325 TABLET ORAL at 20:19

## 2023-04-28 RX ADMIN — SODIUM CHLORIDE, POTASSIUM CHLORIDE, SODIUM LACTATE AND CALCIUM CHLORIDE: 600; 310; 30; 20 INJECTION, SOLUTION INTRAVENOUS at 11:00

## 2023-04-28 RX ADMIN — VANCOMYCIN HYDROCHLORIDE 1000 MG: 1 INJECTION, POWDER, LYOPHILIZED, FOR SOLUTION INTRAVENOUS at 09:18

## 2023-04-28 RX ADMIN — DOCUSATE SODIUM 50 MG AND SENNOSIDES 8.6 MG 1 TABLET: 8.6; 5 TABLET, FILM COATED ORAL at 20:16

## 2023-04-28 RX ADMIN — POLYETHYLENE GLYCOL 3350 17 G: 17 POWDER, FOR SOLUTION ORAL at 09:07

## 2023-04-28 RX ADMIN — ACETAMINOPHEN 650 MG: 325 TABLET ORAL at 09:07

## 2023-04-28 RX ADMIN — SODIUM CHLORIDE, POTASSIUM CHLORIDE, SODIUM LACTATE AND CALCIUM CHLORIDE 500 ML: 600; 310; 30; 20 INJECTION, SOLUTION INTRAVENOUS at 09:07

## 2023-04-28 RX ADMIN — DOCUSATE SODIUM 50 MG AND SENNOSIDES 8.6 MG 1 TABLET: 8.6; 5 TABLET, FILM COATED ORAL at 09:07

## 2023-04-28 RX ADMIN — MAGNESIUM HYDROXIDE 30 ML: 1200 LIQUID ORAL at 09:07

## 2023-04-28 RX ADMIN — DOCUSATE SODIUM 200 MG: 100 CAPSULE, LIQUID FILLED ORAL at 09:07

## 2023-04-28 RX ADMIN — BISACODYL 5 MG: 5 TABLET, COATED ORAL at 09:07

## 2023-04-28 RX ADMIN — FAMOTIDINE 40 MG: 20 TABLET, FILM COATED ORAL at 09:07

## 2023-04-28 RX ADMIN — OXYCODONE HYDROCHLORIDE 10 MG: 10 TABLET ORAL at 04:30

## 2023-04-28 RX ADMIN — DIPHENHYDRAMINE HYDROCHLORIDE 25 MG: 25 TABLET ORAL at 09:04

## 2023-04-28 RX ADMIN — OXYCODONE 5 MG: 5 TABLET ORAL at 13:53

## 2023-04-28 ASSESSMENT — PAIN SCALES - GENERAL
PAINLEVEL_OUTOF10: 6
PAINLEVEL_OUTOF10: 7

## 2023-04-28 ASSESSMENT — PAIN DESCRIPTION - ORIENTATION: ORIENTATION: LOWER

## 2023-04-28 ASSESSMENT — PAIN DESCRIPTION - DESCRIPTORS
DESCRIPTORS: ACHING;SPASM;DISCOMFORT
DESCRIPTORS: THROBBING;ACHING;PRESSURE

## 2023-04-28 ASSESSMENT — PAIN DESCRIPTION - LOCATION
LOCATION: BACK
LOCATION: BACK

## 2023-04-28 NOTE — CARE COORDINATION
04/28/23 Update CM Note: Patient POD 3 lumbar fusion. She is moving slowly but is working with therapies. She is up to the chair. She has brace intact. Drain is due to come out today. Pain is controlled. She is continued on iv vanc bid and has required iv benadryl with dosing due to c/o itching with nausea/and flushed feeling. She has some hypotension over night and is currently stating she is feeling better. /52 with fluid bolus. Her plan remains to return home with her  despite needed mod assist with cueing. Offered to have ARU relook at patient and she declines. She states she prepared her home for the discharge and she is all set to be able to function at home. [facility] is following for discharge. Will continue to follow for readiness to discharge. Referral sent to [id number] for wheeled walker as patient has no dme choice. Electronically signed by Alex Saeed RN CM on 4/28/2023 at 9:38 AM     The Plan for Transition of Care is related to the following treatment goals: dme     The Patient and/or patient representative  was provided with a choice of provider and agrees   with the discharge plan. [x] Yes [] No    Freedom of choice list was provided with basic dialogue that supports the patient's individualized plan of care/goals, treatment preferences and shares the quality data associated with the providers.  [x] Yes [] No

## 2023-04-29 VITALS
RESPIRATION RATE: 16 BRPM | HEART RATE: 70 BPM | DIASTOLIC BLOOD PRESSURE: 62 MMHG | TEMPERATURE: 98.4 F | BODY MASS INDEX: 25.21 KG/M2 | HEIGHT: 62 IN | SYSTOLIC BLOOD PRESSURE: 108 MMHG | OXYGEN SATURATION: 98 % | WEIGHT: 137 LBS

## 2023-04-29 PROCEDURE — 6370000000 HC RX 637 (ALT 250 FOR IP): Performed by: NEUROLOGICAL SURGERY

## 2023-04-29 PROCEDURE — G0378 HOSPITAL OBSERVATION PER HR: HCPCS

## 2023-04-29 RX ADMIN — ONDANSETRON 4 MG: 4 TABLET, ORALLY DISINTEGRATING ORAL at 16:05

## 2023-04-29 RX ADMIN — ACETAMINOPHEN 650 MG: 325 TABLET ORAL at 08:11

## 2023-04-29 RX ADMIN — FAMOTIDINE 40 MG: 20 TABLET, FILM COATED ORAL at 08:11

## 2023-04-29 RX ADMIN — ACETAMINOPHEN 650 MG: 325 TABLET ORAL at 03:13

## 2023-04-29 RX ADMIN — DIPHENHYDRAMINE HYDROCHLORIDE 25 MG: 25 TABLET ORAL at 03:10

## 2023-04-29 RX ADMIN — OXYCODONE 5 MG: 5 TABLET ORAL at 03:13

## 2023-04-29 RX ADMIN — ACETAMINOPHEN 650 MG: 325 TABLET ORAL at 13:05

## 2023-04-29 RX ADMIN — OXYCODONE 5 MG: 5 TABLET ORAL at 13:02

## 2023-04-29 ASSESSMENT — PAIN - FUNCTIONAL ASSESSMENT: PAIN_FUNCTIONAL_ASSESSMENT: PREVENTS OR INTERFERES SOME ACTIVE ACTIVITIES AND ADLS

## 2023-04-29 ASSESSMENT — PAIN DESCRIPTION - ORIENTATION: ORIENTATION: POSTERIOR

## 2023-04-29 ASSESSMENT — PAIN DESCRIPTION - LOCATION
LOCATION: BACK
LOCATION: BACK

## 2023-04-29 ASSESSMENT — PAIN DESCRIPTION - DESCRIPTORS
DESCRIPTORS: ACHING;DISCOMFORT;SORE
DESCRIPTORS: ACHING;BURNING;STABBING

## 2023-04-29 ASSESSMENT — PAIN SCALES - GENERAL
PAINLEVEL_OUTOF10: 4
PAINLEVEL_OUTOF10: 6
PAINLEVEL_OUTOF10: 6

## 2023-04-29 NOTE — CARE COORDINATION
[facility] notified of homecare orders and discharge today. They will follow up with patient at home. For questions I can be reached at [phone].  Teri Varela

## 2023-04-29 NOTE — PROGRESS NOTES
[facility]   Progress note      Subjective: The patient is awake and alert. In bed, hasn't ambulated yet today. She is hypotensive persistently. Not feeling well. Hasn't moved bowels. Urinating appropriately. On low rate ns fluids. Not eating much, continues to complain of nausea. Has prn medications. No acute events overnight. Denies chest pain, angina, SOB     Objective:    /72   Pulse 81   Temp 97.4 °F (36.3 °C) (Temporal)   Resp 18   Ht 5' 2\" (1.575 m)   Wt 137 lb (62.1 kg)   SpO2 97%   BMI 25.06 kg/m²     In: 4303.2 [P.O.:1140; I.V.:3163.2]  Out: 25   In: 4303.2   Out: 25 [Drains:25]    General:  Awake, alert, oriented X 3. Well developed, well nourished, well groomed. No apparent distress. HEENT:  Normocephalic, atraumatic. Pupils equal, round, reactive to light. No scleral icterus. No conjunctival injection. Normal lips, teeth, and gums. No nasal discharge. Neck:  Supple, FROM  Heart:  RRR, no murmurs, gallops, rubs, carotid upstroke normal, no carotid bruits  Lungs:  CTA bilaterally, bilat symmetrical expansion, no wheeze, rales, or rhonchi  Abdomen:   Bowel sounds present, soft, nontender, no masses, no organomegaly, no peritoneal signs, accordion drain  Extremities:  No clubbing, cyanosis, or edema  Skin: Surgical incision with drain to lumbar region  Neuro:  Cranial nerves 2-12 intact, no focal deficits  Vascular: Radial and pedal Pulses 2+     Recent Labs     04/26/23  0619 04/27/23  0547 04/28/23  0701   WBC 14.5* 11.6* 10.7   HGB 10.9* 9.8* 9.5*   HCT 31.6* 30.1* 29.2*    187 151       Recent Labs     04/25/23  2127 04/26/23  0619 04/28/23  0701    144 140   K 3.6 3.8 3.3*   * 113* 109*   CO2 19* 18* 22   BUN 18 16 10   CREATININE 0.8 0.8 0.8   CALCIUM 8.2* 8.3* 8.0*       Assessment:    Principal Problem:    Spondylolisthesis of lumbar region  Active Problems:    Spondylolisthesis, lumbar region  Resolved Problems:    * No resolved
CLINICAL PHARMACY NOTE: MEDS TO BEDS    Total # of Prescriptions Filled: 2   The following medications were delivered to the patient:  Diazepam 5 mg  Oxycodone 5 mg    Additional Documentation:
Department of Neurosurgery  Attending Progress Note    CHIEF COMPLAINT:    SUBJECTIVE:  no new events     ROS:    OBJECTIVE  Physical  VITALS:  /63   Pulse 80   Temp 98.3 °F (36.8 °C) (Temporal)   Resp 18   Ht 5' 2\" (1.575 m)   Wt 137 lb (62.1 kg)   SpO2 99%   BMI 25.06 kg/m²   NEUROLOGIC:  Mental Status Exam:  Level of Alertness:   awake  Orientation:   person, place, time  Motor Exam:  Motor exam is symmetrical 5 out of 5 all extremities bilaterally  Sensory:  Sensory intact    Data  CBC:   Lab Results   Component Value Date/Time    WBC 10.7 04/28/2023 07:01 AM    RBC 2.98 04/28/2023 07:01 AM    HGB 9.5 04/28/2023 07:01 AM    HCT 29.2 04/28/2023 07:01 AM    MCV 98.0 04/28/2023 07:01 AM    MCH 31.9 04/28/2023 07:01 AM    MCHC 32.5 04/28/2023 07:01 AM    RDW 14.2 04/28/2023 07:01 AM     04/28/2023 07:01 AM    MPV 10.6 04/28/2023 07:01 AM     BMP:    Lab Results   Component Value Date/Time     04/28/2023 07:01 AM    K 3.3 04/28/2023 07:01 AM    K 3.6 04/25/2023 09:27 PM     04/28/2023 07:01 AM    CO2 22 04/28/2023 07:01 AM    BUN 10 04/28/2023 07:01 AM    LABALBU 2.8 04/28/2023 07:01 AM    CREATININE 0.8 04/28/2023 07:01 AM    CALCIUM 8.0 04/28/2023 07:01 AM    GFRAA >60 07/25/2017 01:25 PM    LABGLOM >60 04/28/2023 07:01 AM    GLUCOSE 105 04/28/2023 07:01 AM     Current Inpatient Medications  Current Facility-Administered Medications: magnesium hydroxide (MILK OF MAGNESIA) 400 MG/5ML suspension 30 mL, 30 mL, Oral, Daily PRN  lactated ringers IV soln infusion, , IntraVENous, Continuous  Menthol LOZG 1 drop, 1 drop, Mouth/Throat, PRN  phenol 1.4 % mouth spray 1 spray, 1 spray, Mouth/Throat, Q2H PRN  famotidine (PEPCID) tablet 40 mg, 40 mg, Oral, Daily  rosuvastatin (CRESTOR) tablet 20 mg, 20 mg, Oral, Daily  zolpidem (AMBIEN) tablet 5 mg, 5 mg, Oral, Nightly PRN  sodium chloride flush 0.9 % injection 5-40 mL, 5-40 mL, IntraVENous, 2 times per day  sodium chloride flush 0.9 % injection
Department of Neurosurgery  Progress Note    CHIEF COMPLAINT: s/p lumbar fusion    SUBJECTIVE:  c/o op site pain    REVIEW OF SYSTEMS :  Constitutional: Negative for chills and fever. Neurological: Negative for dizziness, tremors and speech change.      OBJECTIVE:   VITALS:  /60   Pulse 72   Temp 97.9 °F (36.6 °C) (Temporal)   Resp 16   Ht 5' 2\" (1.575 m)   Wt 137 lb (62.1 kg)   SpO2 97%   BMI 25.06 kg/m²   PHYSICAL:  CONSTITUTIONAL:  awake, alert, cooperative, no apparent distress, and appears stated age    DATA:  CBC:   Lab Results   Component Value Date/Time    WBC 14.5 04/26/2023 06:19 AM    RBC 3.41 04/26/2023 06:19 AM    HGB 10.9 04/26/2023 06:19 AM    HCT 31.6 04/26/2023 06:19 AM    MCV 92.7 04/26/2023 06:19 AM    MCH 32.0 04/26/2023 06:19 AM    MCHC 34.5 04/26/2023 06:19 AM    RDW 13.2 04/26/2023 06:19 AM     04/26/2023 06:19 AM    MPV 9.4 04/26/2023 06:19 AM     BMP:    Lab Results   Component Value Date/Time     04/25/2023 09:27 PM    K 3.6 04/25/2023 09:27 PM     04/25/2023 09:27 PM    CO2 19 04/25/2023 09:27 PM    BUN 18 04/25/2023 09:27 PM    LABALBU 3.8 04/25/2023 09:27 PM    CREATININE 0.8 04/25/2023 09:27 PM    CALCIUM 8.2 04/25/2023 09:27 PM    GFRAA >60 07/25/2017 01:25 PM    LABGLOM >60 04/25/2023 09:27 PM    GLUCOSE 151 04/25/2023 09:27 PM     PT/INR:    Lab Results   Component Value Date/Time    PROTIME 11.0 04/19/2023 11:40 AM    INR 1.0 04/19/2023 11:40 AM     PTT:  No results found for: APTT, PTT[APTT}    Current Inpatient Medications  Current Facility-Administered Medications: famotidine (PEPCID) tablet 40 mg, 40 mg, Oral, Daily  rosuvastatin (CRESTOR) tablet 20 mg, 20 mg, Oral, Daily  zolpidem (AMBIEN) tablet 5 mg, 5 mg, Oral, Nightly PRN  sodium chloride flush 0.9 % injection 5-40 mL, 5-40 mL, IntraVENous, 2 times per day  sodium chloride flush 0.9 % injection 5-40 mL, 5-40 mL, IntraVENous, PRN  0.9 % sodium chloride infusion, , IntraVENous,
Discharge instructions given to patient and  . Verbalizes understanding and has no questions .  I instructed to change dressing every other day due on Monday 5-1-23
Drain removed intact, incision cleansed with NSS and patted dry, new dressing applied. Incision without redness swelling or drainage or odor.  Pt tolerated well
[facility]   Progress note      Subjective: The patient is awake and alert. In bed, just worked w/ ot. Not feeling well. Hasn't moved bowels. Cath came out yesterday, urinating well. Not eating much, continues to complain of nausea. Has prn medications. No acute events overnight. Denies chest pain, angina, SOB     Objective:    /63   Pulse 80   Temp 97.8 °F (36.6 °C) (Temporal)   Resp 16   Ht 5' 2\" (1.575 m)   Wt 137 lb (62.1 kg)   SpO2 97%   BMI 25.06 kg/m²     In: 1680 [P.O.:1680]  Out: 1085   In: 1680   Out: 1085 [Urine:1035; Drains:50]    General:  Awake, alert, oriented X 3. Well developed, well nourished, well groomed. No apparent distress. HEENT:  Normocephalic, atraumatic. Pupils equal, round, reactive to light. No scleral icterus. No conjunctival injection. Normal lips, teeth, and gums. No nasal discharge. Neck:  Supple, FROM  Heart:  RRR, no murmurs, gallops, rubs, carotid upstroke normal, no carotid bruits  Lungs:  CTA bilaterally, bilat symmetrical expansion, no wheeze, rales, or rhonchi  Abdomen: Bowel sounds present, soft, nontender, no masses, no organomegaly, no peritoneal signs, accordion drain  Extremities:  No clubbing, cyanosis, or edema  Skin: Surgical incision with drain to lumbar region  Neuro:  Cranial nerves 2-12 intact, no focal deficits  Vascular: Radial and pedal Pulses 2+     Recent Labs     04/25/23 2127 04/26/23  0619 04/27/23  0547   WBC 12.5* 14.5* 11.6*   HGB 11.2* 10.9* 9.8*   HCT 33.4* 31.6* 30.1*    217 187       Recent Labs     04/25/23 2127 04/26/23  0619    144   K 3.6 3.8   * 113*   CO2 19* 18*   BUN 18 16   CREATININE 0.8 0.8   CALCIUM 8.2* 8.3*       Assessment:    Principal Problem:    Spondylolisthesis of lumbar region  Active Problems:    Spondylolisthesis, lumbar region  Resolved Problems:    * No resolved hospital problems.  *      Plan:    Patient is a 70-year-old female admitted to [place] [address]
I discussed a suppository with this patient and she wanted one later on this evening.
INTRAOPERATIVE MONITORING EMG REPORT    Diagnosis: spondylolisthesis  Procedure: PLIF L4/5   Anesthesia: isoflurane  Surgeon: Ranjit Desir M.D. Intra-op Monitorin.25 hours    Procedure:     EMG recording electrodes were placed over the vastus medialis, vastus lateralis, anterior tibialis, and medial gastrocnemius   muscles for recording spontaneous EMG activity. A silent control was performed to test the integrity of the system prior to the procedure. Results:  Spontaneous EMG: was quiet during screw placement and decompression. EMG was noted from the right AT during cage placement. This activity was sustained then quieted. EMG was quiet during cage placement on the left side, during jak placement and at closing.      Evoked EMG:   LEFT    Direct Nerve (mA)            Screw (mA)   L4                                                      21  L5                                                      17     RIGHT     L4                                                      28  L5                                                      27
Messaged April BRIJESH Schreiber for medical management consult.
Occupational Therapy          OCCUPATIONAL THERAPY INITIAL EVALUATION     [address] [address]  [address]      Date:2023                 Patient Name: Castro Wagner  MRN: [id number]  : 1955  Room: [address] [place] [address]    Referring Provider: Robin Sanchez MD  Specific Provider Orders/Date: OT evaluation and treat 23    Evaluating OT: Bjorn Merritt OTR/L #[id number]    Diagnosis: Spondylolisthesis of lumbar region [M43.16]      Surgery: s/p  L4-L5 POSTERIOR LUMBAR INTERBODY FUSION  23    Pertinent Medical History:  has a past medical history of GERD (gastroesophageal reflux disease), Hiatal hernia, HLD (hyperlipidemia), HTN (hypertension), Osteopenia, and Spinal stenosis.      Precautions:  Fall Risk, spinal , LSO brace, hemovac drain    Assessment of current deficits   [x] Functional mobility  [x]ADLs  [] Strength               []Cognition   [x] Functional transfers   [x] IADLs         [x] Safety Awareness   [x]Endurance   [] Fine Coordination              [] Balance      [] Vision/perception   []Sensation    []Gross Motor Coordination  [] ROM  [] Delirium                   [] Motor Control     OT PLAN OF CARE   OT POC based on physician orders, patient diagnosis and results of clinical assessment    Frequency/Duration   2-4  days/wk for 1 week PRN   Specific OT Treatment Interventions to include:   * Instruction/training on adapted ADL techniques and AE recommendations to increase functional independence within precautions       * Training on energy conservation strategies, correct breathing pattern and techniques to improve independence/tolerance for self-care routine  * Functional transfer/mobility training/DME recommendations for increased independence, safety, and fall prevention  * Patient/Family education to increase follow through with safety techniques and functional independence  * Recommendation of environmental
Occupational Therapy  OT BEDSIDE TREATMENT NOTE   [address] [facility] [address] [facility]  [address] [place] [address]        Date:2023  Patient Name: Ivory Craig  MRN: [id number]  : 1955  Room: [address] [place] [address]     Per OT Eval:    Evaluating OT: Sandrita Grayson. René OTR/L #[id number]     Diagnosis: Spondylolisthesis of lumbar region [M43.16]       Surgery: s/p  L4-L5 POSTERIOR LUMBAR INTERBODY FUSION  23     Pertinent Medical History:  has a past medical history of GERD (gastroesophageal reflux disease), Hiatal hernia, HLD (hyperlipidemia), HTN (hypertension), Osteopenia, and Spinal stenosis.       Precautions:  Fall Risk, spinal , LSO brace, hemovac drain     Assessment of current deficits   [x] Functional mobility             [x]ADLs           [] Strength                  []Cognition   [x] Functional transfers           [x] IADLs         [x] Safety Awareness   [x]Endurance   [] Fine Coordination              [] Balance      [] Vision/perception   []Sensation     []Gross Motor Coordination  [] ROM           [] Delirium                   [] Motor Control      OT PLAN OF CARE   OT POC based on physician orders, patient diagnosis and results of clinical assessment     Frequency/Duration   2-4  days/wk for 1 week PRN   Specific OT Treatment Interventions to include:   * Instruction/training on adapted ADL techniques and AE recommendations to increase functional independence within precautions       * Training on energy conservation strategies, correct breathing pattern and techniques to improve independence/tolerance for self-care routine  * Functional transfer/mobility training/DME recommendations for increased independence, safety, and fall prevention  * Patient/Family education to increase follow through with safety techniques and functional independence  * Recommendation of environmental modifications for increased safety with functional transfers/mobility and ADLs  * Sensory
Occupational Therapy  OT BEDSIDE TREATMENT NOTE   [address] [facility] [address] [address] [facility]  [address] [place] [address]        Date:2023  Patient Name: Andree Durbin  MRN: [id number]  : 1955  Room: [address] [place] [address]     Per OT Eval:    Evaluating OT: Dread Sinclair. René OTR/L #[id number]     Diagnosis: Spondylolisthesis of lumbar region [M43.16]       Surgery: s/p  L4-L5 POSTERIOR LUMBAR INTERBODY FUSION  23     Pertinent Medical History:  has a past medical history of GERD (gastroesophageal reflux disease), Hiatal hernia, HLD (hyperlipidemia), HTN (hypertension), Osteopenia, and Spinal stenosis.       Precautions:  Fall Risk, spinal , LSO brace, hemovac drain     Assessment of current deficits   [x] Functional mobility             [x]ADLs           [] Strength                  []Cognition   [x] Functional transfers           [x] IADLs         [x] Safety Awareness   [x]Endurance   [] Fine Coordination              [] Balance      [] Vision/perception   []Sensation     []Gross Motor Coordination  [] ROM           [] Delirium                   [] Motor Control      OT PLAN OF CARE   OT POC based on physician orders, patient diagnosis and results of clinical assessment     Frequency/Duration   2-4  days/wk for 1 week PRN   Specific OT Treatment Interventions to include:   * Instruction/training on adapted ADL techniques and AE recommendations to increase functional independence within precautions       * Training on energy conservation strategies, correct breathing pattern and techniques to improve independence/tolerance for self-care routine  * Functional transfer/mobility training/DME recommendations for increased independence, safety, and fall prevention  * Patient/Family education to increase follow through with safety techniques and functional independence  * Recommendation of environmental modifications for increased safety with functional transfers/mobility and ADLs  * Sensory
Physical Therapy Initial Evaluation    Name: Julia Ashley  : 1955  MRN: [id number]      Date of Service: 2023    Evaluating PT:  Yunier Holliday, PT [id number]    Referring provider/PT Order:  PT Eval and Treat   23 1345  PT eval and treat        Kvng Tillman MD     Room #:  8210/8210-B  Diagnosis:  Spondylolisthesis of lumbar region [M43.16]  PMHx/PSHx:     has a past medical history of GERD (gastroesophageal reflux disease), Hiatal hernia, HLD (hyperlipidemia), HTN (hypertension), Osteopenia, and Spinal stenosis. has a past surgical history that includes Foot surgery (2014); lumbar discectomy (2015); sinus surgery; Breast reduction surgery (); Dilation and curettage of uterus (); and Upper gastrointestinal endoscopy. Procedure/Surgery:  23  Precautions:  Falls,  FWB (full weight bearing), Hemovac drain, Spinal neutral mechanics, and LSO on when greater than 45degrees (elective)  Equipment Needs: Wheeled Walker,    SUBJECTIVE:    Patient lives with spouse  in a ranch home  with 2 steps to enter with 1Rail  Bed is on 1 floor and bath is on 1 floor. Patient ambulated independently  PTA. Equipment owned: [employer]uy,      OBJECTIVE:   Initial Evaluation  Date: 23 Treatment Short Term/ Long Term   Goals   AM-PAC 6 Clicks 76/46     Was pt agreeable to Eval/treatment? yes     Does pt have pain? yes     Bed Mobility  Rolling: Mod   Supine to sit:   Mod    Sit to supine: NT   Scooting: Mod    Rolling: Ind  Supine to sit: Ind  Sit to supine: Ind  Scooting: Ind   Transfers Sit to stand: Min to fww  Stand to sit: Min  Stand pivot: Min with fww  Sit to stand: Mod Ind  to fww  Stand to sit: Mod Ind    Stand pivot: Mod Ind  with fww   Ambulation    25 feet with fww Mod cues for sequencing.    100 feet with Mod Ind  fww   Stair negotiation: ascended and descended  NT  2 steps with Min      Strength/ROM:     RLE grossly 4/5  LLE grossly 4/5  RLE AROM WFL  LLE AROM WFL    Balance:
Physical Therapy Initial Evaluation    Name: Justyn Sheehan  : 1955  MRN: [id number]      Date of Service: 2023    Evaluating PT:  Mukund Costello, PT [id number]    Referring provider/PT Order:  PT Eval and Treat   23 1345  PT eval and treat        Ashleigh Hebert MD     Room #:  8210/8210-B  Diagnosis:  Spondylolisthesis of lumbar region [M43.16]  Spondylolisthesis, lumbar region [M43.16]  PMHx/PSHx:     has a past medical history of GERD (gastroesophageal reflux disease), Hiatal hernia, HLD (hyperlipidemia), HTN (hypertension), Osteopenia, and Spinal stenosis. has a past surgical history that includes Foot surgery (2014); lumbar discectomy (2015); sinus surgery; Breast reduction surgery (); Dilation and curettage of uterus (); and Upper gastrointestinal endoscopy. Procedure/Surgery:  23  Precautions:  Falls,  FWB (full weight bearing), Hemovac drain, Spinal neutral mechanics, and LSO on when greater than 45degrees (elective)  Equipment Needs: Wheeled Walker,    SUBJECTIVE:    Patient lives with spouse  in a ranch home  with 2 steps to enter with 1Rail  Bed is on 1 floor and bath is on 1 floor. Patient ambulated independently  PTA. Equipment owned: Rollator,      OBJECTIVE:   Initial Evaluation  Date: 23 Treatment  23 Short Term/ Long Term   Goals   AM-PAC 6 Clicks 54/15     Was pt agreeable to Eval/treatment? yes yes    Does pt have pain? yes Minimal anxious. Bed Mobility  Rolling: Mod   Supine to sit:   Mod    Sit to supine: NT   Scooting: Mod   Rolling: Min   Supine to sit:   Min    Sit to supine: Min  Scooting: Min   Rolling: Ind  Supine to sit: Ind  Sit to supine: Ind  Scooting: Ind   Transfers Sit to stand: Min to fww  Stand to sit: Min  Stand pivot: Min with fww Sit to stand: Min to fww  Stand to sit: Min  Stand pivot: Min with fww Sit to stand: Mod Ind  to fww  Stand to sit: Mod Ind    Stand pivot:  Mod Ind  with fww   Ambulation    25 feet with
Physical Therapy Treatment Note    Name: Sarah Samaniego  : 1955  MRN: [id number]      Date of Service: 2023    Evaluating PT:  Nickolas Shin, PT [id number]    Referring provider/PT Order:  PT Eval and Treat   23 1345  PT eval and treat        Prudence Roberson MD     Room #:  8210/8210-B  Diagnosis:  Spondylolisthesis of lumbar region [M43.16]  Spondylolisthesis, lumbar region [M43.16]  PMHx/PSHx:     has a past medical history of GERD (gastroesophageal reflux disease), Hiatal hernia, HLD (hyperlipidemia), HTN (hypertension), Osteopenia, and Spinal stenosis. has a past surgical history that includes Foot surgery (2014); lumbar discectomy (2015); sinus surgery; Breast reduction surgery (); Dilation and curettage of uterus (); Upper gastrointestinal endoscopy; and Lumbar spine surgery (N/A, 2023). Procedure/Surgery:  23  Precautions:  Falls,  FWB (full weight bearing), Hemovac drain, Spinal neutral mechanics, and LSO on when greater than 45degrees (elective)  Equipment Needs: Wheeled Walker,    SUBJECTIVE:    Patient lives with spouse  in a ranch home  with 2 steps to enter with 1Rail  Bed is on 1 floor and bath is on 1 floor. Patient ambulated independently  PTA. Equipment owned: Rollator,      OBJECTIVE:   Initial Evaluation  Date: 23 Treatment  23 Short Term/ Long Term   Goals   AM-PAC 6 Clicks     Was pt agreeable to Eval/treatment? yes yes    Does pt have pain? yes Minimal anxious. Bed Mobility  Rolling: Mod   Supine to sit:   Mod    Sit to supine: NT   Scooting: Mod   NT up in chair pre and post session. Rolling: Ind  Supine to sit: Ind  Sit to supine: Ind  Scooting: Ind   Transfers Sit to stand: Min to fww  Stand to sit: Min  Stand pivot: Min with fww Sit to stand: Min to fww  Stand to sit: Min  Stand pivot: Min with fww Sit to stand: Mod Ind  to fww  Stand to sit: Mod Ind    Stand pivot:  Mod Ind  with fww   Ambulation    25 feet with
Physician Progress Note      Becki García  [facility] #:                  [id number]  :                       1955  ADMIT DATE:       2023 7:51 AM  DISCH DATE:  RESPONDING  PROVIDER #:        Ruthie Gan MD          QUERY TEXT:    Dear Provider,    Pt s/p L4-L5 lumbar fusion for spondylolisthesis and has anemia documented. If   possible, please document in progress notes and discharge summary further   specificity regarding the acuity and type of anemia:    The medical record reflects the following:  Risk Factors: S/p L4-L5 lumbar fusion for spondylolisthesis  Clinical Indicators: EBL <100   Internal medicine note: Worsening anemia 14.1/41.1 > 10.9/31.6, likely   due to surgery, will need to  continue to monitor  HGB 14.1/Hct 41.1Hgb-- 11.2/Hct 33.4  --Hgb 10.9/Hct 31.6---Hgb 9.8/Hct 30.1  Treatment: Daily CBC, Monitor for bleeding    Thank you,  Keyona Beck RN  Clinical Documentation Improvement  [phone]  Options provided:  -- Anemia due to acute blood loss  -- Anemia due to postoperative blood loss  -- Other - I will add my own diagnosis  -- Disagree - Not applicable / Not valid  -- Disagree - Clinically unable to determine / Unknown  -- Refer to Clinical Documentation Reviewer    PROVIDER RESPONSE TEXT:    This patient has acute blood loss anemia.     Query created by: Sony Valle on 2023 11:30 AM      Electronically signed by:  Ruthie Gan MD 2023 4:02 PM
Pt is hypotensive 90/50.   Trendelenburg bed will recheck BP restarted fluids
Report called to [phone]
Spoke with the patient at the bedside. Discussed ARU. Patient lives in a ranch home with 2 steps to enter and 1 rail, with her  and was independent PTA and drove. Patient states that is she can she would like to discharge home with home therapy. Will review with Dr. Mary Jo Mckeon for her recommendations.
This patient has a sensitive stomach (per patient) nausea with pain meds and vanc. Her cheeks were red and itchy yesterday after Vanc, no problems with swallowing or O2. She refused Benadryl both yesterday and today. Less redness and itching on her face today. She got a little  hypotensive after working with Andria, OT this morning. But this was the first time she got up to the bathroom, she was using BSC, she felt a little dizzy and nauseous.
20 mg, 20 mg, Oral, Daily  zolpidem (AMBIEN) tablet 5 mg, 5 mg, Oral, Nightly PRN  sodium chloride flush 0.9 % injection 5-40 mL, 5-40 mL, IntraVENous, 2 times per day  sodium chloride flush 0.9 % injection 5-40 mL, 5-40 mL, IntraVENous, PRN  0.9 % sodium chloride infusion, , IntraVENous, PRN  acetaminophen (TYLENOL) tablet 650 mg, 650 mg, Oral, Q6H  ondansetron (ZOFRAN-ODT) disintegrating tablet 4 mg, 4 mg, Oral, Q8H PRN **OR** ondansetron (ZOFRAN) injection 4 mg, 4 mg, IntraVENous, Q6H PRN  oxyCODONE (ROXICODONE) immediate release tablet 5 mg, 5 mg, Oral, Q4H PRN **OR** oxyCODONE HCl (OXY-IR) immediate release tablet 10 mg, 10 mg, Oral, Q4H PRN  morphine (PF) injection 2 mg, 2 mg, IntraVENous, Q2H PRN **OR** morphine sulfate (PF) injection 4 mg, 4 mg, IntraVENous, Q2H PRN  diazePAM (VALIUM) tablet 5 mg, 5 mg, Oral, Q6H PRN  diphenhydrAMINE (BENADRYL) tablet 25 mg, 25 mg, Oral, Q6H PRN **OR** diphenhydrAMINE (BENADRYL) injection 25 mg, 25 mg, IntraVENous, Q6H PRN  polyethylene glycol (GLYCOLAX) packet 17 g, 17 g, Oral, Daily  bisacodyl (DULCOLAX) EC tablet 5 mg, 5 mg, Oral, Daily  sennosides-docusate sodium (SENOKOT-S) 8.6-50 MG tablet 1 tablet, 1 tablet, Oral, BID  bisacodyl (DULCOLAX) suppository 10 mg, 10 mg, Rectal, Daily PRN  sodium phosphate (FLEET) rectal enema 1 enema, 1 enema, Rectal, Daily PRN  docusate sodium (COLACE) capsule 200 mg, 200 mg, Oral, BID  trimethobenzamide (TIGAN) injection 200 mg, 200 mg, IntraMUSCular, Q6H PRN  promethazine (PHENERGAN) injection 6.25 mg, 6.25 mg, IntraMUSCular, Q6H PRN  lisinopril (PRINIVIL;ZESTRIL) tablet 20 mg, 20 mg, Oral, Daily **AND** hydroCHLOROthiazide (HYDRODIURIL) tablet 12.5 mg, 12.5 mg, Oral, Daily    ASSESSMENT:   S/p L4-5 PLIF on 4/25 - stable  IV pain control  Drain management    PLAN:  Pain control  PT/OT  WBAT  Drain care. Plan to remove today  Follow up in Neurosurgery clinic in 2 weeks for staple removal and in 4 weeks with XR  Discharge planning.  Plan
staple removal and in 4 weeks with XR  Discharge planning.        Electronically signed by Jazzy Don PA-C on 4/27/2023 at 7:45 AM

## 2023-04-29 NOTE — DISCHARGE SUMMARY
[facility]   Discharge summary   Patient ID:  Sherry Tena  [id number]  76 y.o.  1955    Admit date: 4/25/2023    Discharge date and time: 4/29/2023    Admission Diagnoses:   Patient Active Problem List   Diagnosis    HLD (hyperlipidemia)    GERD (gastroesophageal reflux disease)    Spondylolisthesis of lumbar region    Spondylolisthesis, lumbar region       Discharge Diagnoses: ***    Consults: {consultation:44733}    Procedures: [facility] Course: The patient is a 76 y.o. female of Saeed Cho DO with significant past medical history of *** who presents with ***    Recent Labs     04/27/23  0547 04/28/23  0701   WBC 11.6* 10.7   HGB 9.8* 9.5*   HCT 30.1* 29.2*    151       Recent Labs     04/28/23  0701      K 3.3*   *   CO2 22   BUN 10   CREATININE 0.8   CALCIUM 8.0*       FLUORO FOR SURGICAL PROCEDURES    Result Date: 4/25/2023  EXAMINATION: SPOT FLUOROSCOPIC IMAGES 4/25/2023 12:06 pm TECHNIQUE: Fluoroscopy was provided by the radiology department for procedure. Radiologist was not present during examination. FLUOROSCOPY DOSE AND TYPE: Radiation Exposure Index: Fluoroscopy time equals 33.6 seconds. Total dose equals 25.14 mGy, COMPARISON: None HISTORY: ORDERING SYSTEM PROVIDED HISTORY: L4-L5 PLIF w/ Cages TECHNOLOGIST PROVIDED HISTORY: Reason for exam:->L4-L5 PLIF w/ Cages What reading provider will be dictating this exam?->CRC Intraprocedural imaging. FINDINGS: 2 spot images of the lumbar spine were obtained. Intraprocedural fluoroscopic spot images as above. See separate procedure report for more information. Discharge Exam:    HEENT: NCAT,  PERRLA, No JVD  Heart:  RRR, no murmurs, gallops, or rubs.   Lungs:  CTA bilaterally, no wheeze, rales or rhonchi  Abd: bowel sounds present, nontender, nondistended, no masses  Extrem:  No clubbing, cyanosis, or edema    Disposition: {disposition:54666}     Patient Condition at Discharge: ***    Patient

## 2023-04-29 NOTE — DISCHARGE INSTRUCTIONS
Lumbar Spinal Fusion: What to Expect at Home  Your Recovery     After surgery, you can expect your back to feel stiff and sore. You may have trouble sitting or standing in one position for very long. It may take 4 to 6 weeks to get back to doing simple activities, such as light housework. It may take 6 months to a year for your back to get better completely. You may need to wear a back brace while your back heals. And your doctor may have you go to physical therapy. If your job doesn't require physical labor, you will probably be able to go back to work after 4 to 6 weeks. If your job involves light physical labor, it may take 3 to 6 months. If your job involves heavy labor, you will probably need to find work doing something less strenuous. There are training programs that can help. This care sheet gives you a general idea about how long it will take for you to recover. But each person recovers at a different pace. Follow the steps below to get better as quickly as possible. How can you care for yourself at home? Activity    Rest when you feel tired. Getting enough sleep will help you recover. Try to walk each day. Start by walking a little more than you did the day before. Bit by bit, increase the amount you walk. Walking boosts blood flow and helps prevent pneumonia and constipation. Walking may also decrease your muscle soreness after surgery. If advised by your doctor, you may need to avoid lifting anything that would cause excessive strain on your back. This may include a child, heavy grocery bags and milk containers, a heavy briefcase or backpack, cat litter or dog food bags, or a vacuum . Avoid strenuous activities, such as bicycle riding, jogging, weight lifting, or aerobic exercise, until your doctor says it is okay. Do not drive for 2 to 4 weeks after your surgery or until your doctor says it is okay.      Avoid riding in a car for more than 30 minutes at a time for 2 to 4

## 2023-04-29 NOTE — PLAN OF CARE
Problem: Discharge Planning  Goal: Discharge to home or other facility with appropriate resources  Outcome: Adequate for Discharge     Problem: ABCDS Injury Assessment  Goal: Absence of physical injury  Outcome: Adequate for Discharge     Problem: Pain  Goal: Verbalizes/displays adequate comfort level or baseline comfort level  Outcome: Adequate for Discharge     Problem: Safety - Adult  Goal: Free from fall injury  Outcome: Adequate for Discharge

## 2023-05-01 ENCOUNTER — TELEPHONE (OUTPATIENT)
Dept: NEUROSURGERY | Age: 68
End: 2023-05-01

## 2023-05-01 DIAGNOSIS — M43.16 SPONDYLOLISTHESIS OF LUMBAR REGION: ICD-10-CM

## 2023-05-01 RX ORDER — CYCLOBENZAPRINE HCL 10 MG
10 TABLET ORAL 3 TIMES DAILY PRN
Qty: 30 TABLET | Refills: 0 | Status: SHIPPED | OUTPATIENT
Start: 2023-05-01 | End: 2023-05-11

## 2023-05-01 RX ORDER — HYDROCODONE BITARTRATE AND ACETAMINOPHEN 5; 325 MG/1; MG/1
1 TABLET ORAL EVERY 4 HOURS PRN
Qty: 42 TABLET | Refills: 0 | Status: SHIPPED | OUTPATIENT
Start: 2023-05-01 | End: 2023-05-08

## 2023-05-01 NOTE — TELEPHONE ENCOUNTER
Pt left voicemail stating she wanted to speak with a provider regarding her surgery. She is 6 days post op. She has questions regarding her medications she has been taking and post op care. Provider can reach her at [phone].

## 2023-05-01 NOTE — TELEPHONE ENCOUNTER
Called patient and answered all questions. Oxycodone switched to Norco and valium switched to flexeril. She is taking her blood pressure every day and is stable. Patient states PCP told her to hold BP meds for now.

## 2023-05-01 NOTE — DISCHARGE SUMMARY
Neurosurgery Surgery Discharge Summary    Moo Colon 12 SUMMARY:                The patient is a 76 y.o. female who was admitted to the hospital on 4/25/2023  7:51 AM for treatment of lumbar spondylolisthesis. On the day of admission, an L4-L5 PLIF was performed. The patient's hospital course was uncomplicated and consisted of physical therapy, incision observation, and a return to normal oral intake. The patient was discharged on 4/29/2023  4:37 PM tolerating a diet, moving bowels, and urinating without difficulty. The incisions were clean and intact. The patient was discharged home with [facility]  in satisfactory condition with instructions to call the office for a follow up appointment. Hospital Problem List:  Principal Problem:    Spondylolisthesis of lumbar region  Active Problems:    Spondylolisthesis, lumbar region  Resolved Problems:    * No resolved hospital problems. *     Procedure(s) (LRB):  L4-L5 POSTERIOR LUMBAR INTERBODY FUSION (N/A)    Discharge Medications:   Discharge Medication List as of 4/29/2023  3:26 PM        START taking these medications    Details   oxyCODONE (ROXICODONE) 5 MG immediate release tablet Take 1 tablet by mouth every 4 hours as needed for Pain for up to 7 days. Max Daily Amount: 30 mg, Disp-42 tablet, R-0Normal      diazePAM (VALIUM) 5 MG tablet Take 1 tablet by mouth every 6 hours as needed for Anxiety (Muscle spasms) for up to 10 days.  Max Daily Amount: 20 mg, Disp-40 tablet, R-0Normal           CONTINUE these medications which have NOT CHANGED    Details   famotidine (PEPCID) 40 MG tablet Historical Med      rosuvastatin (CRESTOR) 20 MG tablet Take 1 tablet by mouth dailyHistorical Med      acetaminophen (TYLENOL) 325 MG tablet Take 2 tablets by mouth every 6 hours as needed for PainHistorical Med      NONFORMULARY Take 1,000 mg by mouth 3 times daily Red marine Calcium with MagnesiumHistorical Med      fluticasone (FLONASE) 50 MCG/ACT nasal

## 2023-05-09 ENCOUNTER — OFFICE VISIT (OUTPATIENT)
Dept: NEUROSURGERY | Age: 68
End: 2023-05-09
Payer: MEDICARE

## 2023-05-09 DIAGNOSIS — Z98.1 S/P LUMBAR FUSION: Primary | ICD-10-CM

## 2023-05-09 PROCEDURE — 99212 OFFICE O/P EST SF 10 MIN: CPT

## 2023-05-09 PROCEDURE — 99024 POSTOP FOLLOW-UP VISIT: CPT | Performed by: STUDENT IN AN ORGANIZED HEALTH CARE EDUCATION/TRAINING PROGRAM

## 2023-05-09 NOTE — PROGRESS NOTES
Encounter for Staple Removal     Nithya Portillo is a 76 y.o.  female  two weeks s/p L4-L5 PLIF      Patient presents for staple removal.      Equipment: General staple removal kit, ChloraPrep, sterile gloves     Procedure: Pt was placed in the sitting position. Using a sterile technique, ChloraPrep was used to clean the incisional wound. The wound healing well without signs of infection. Staples were removed with no pain and no complications. Pt tolerated procedure well. Pts questions were answered and wound care instructions and restrictions were discussed. Pt is to return to neurosurgery clinic in 2 weeks for surgery follow-up or sooner if new issues or concerns arise.

## 2023-05-22 DIAGNOSIS — Z98.1 S/P LUMBAR FUSION: Primary | ICD-10-CM

## 2023-05-23 ENCOUNTER — HOSPITAL ENCOUNTER (OUTPATIENT)
Dept: GENERAL RADIOLOGY | Age: 68
Discharge: HOME | End: 2023-05-25
Payer: MEDICARE

## 2023-05-23 ENCOUNTER — OFFICE VISIT (OUTPATIENT)
Dept: NEUROSURGERY | Age: 68
End: 2023-05-23
Payer: MEDICARE

## 2023-05-23 ENCOUNTER — HOSPITAL ENCOUNTER (OUTPATIENT)
Age: 68
Discharge: HOME | End: 2023-05-25
Payer: MEDICARE

## 2023-05-23 VITALS
DIASTOLIC BLOOD PRESSURE: 71 MMHG | BODY MASS INDEX: 25.21 KG/M2 | SYSTOLIC BLOOD PRESSURE: 111 MMHG | WEIGHT: 137 LBS | RESPIRATION RATE: 18 BRPM | HEIGHT: 62 IN | TEMPERATURE: 98 F | OXYGEN SATURATION: 98 % | HEART RATE: 97 BPM

## 2023-05-23 DIAGNOSIS — M54.41 ACUTE MIDLINE LOW BACK PAIN WITH BILATERAL SCIATICA: Primary | ICD-10-CM

## 2023-05-23 DIAGNOSIS — Z98.1 S/P LUMBAR FUSION: ICD-10-CM

## 2023-05-23 DIAGNOSIS — M43.16 SPONDYLOLISTHESIS OF LUMBAR REGION: ICD-10-CM

## 2023-05-23 DIAGNOSIS — M54.42 ACUTE MIDLINE LOW BACK PAIN WITH BILATERAL SCIATICA: Primary | ICD-10-CM

## 2023-05-23 PROCEDURE — 99024 POSTOP FOLLOW-UP VISIT: CPT | Performed by: PHYSICIAN ASSISTANT

## 2023-05-23 PROCEDURE — 72100 X-RAY EXAM L-S SPINE 2/3 VWS: CPT

## 2023-05-23 PROCEDURE — 99212 OFFICE O/P EST SF 10 MIN: CPT

## 2023-05-24 NOTE — PROGRESS NOTES
Post Operative Follow-up     This is a 76year old female who presents to the office for a 1 month follow-up s/p L4-L5 PLIF     Subjective: Patient states she has been doing well. Back pain and leg pain improving. She does feel like there is a brick in the middle of her low back which makes it hard to sit. Ambulating well. Weaned off pain medications and takes extra strength Tylenol. LSO compliance. No issues with incision site. Lumbar XR reviewed. Physical Exam:              WDWN, no apparent distress              Non-labored breathing               Vitals Stable              Alert and oriented x3              CN 3-12 intact              PERRL              EOMI              AGUILERA well              Motor strength symmetric              Sensation to LT intact bilaterally   Incision healing well with no signs of infection                 Imagin23 lumbar XR: Stable alignment, stable fusion. No acute abnormalities noted. Final report pending. Assessment: This is a 76 y.o.  female presenting for a 1 month follow-up s/p L4-L5 PLIF. Stable. Plan:  -Pain control and expectations discussed  -Continue LSO brace and restrictions   -XR reviewed  -OARRS report reviewed   -Follow-up in neurosurgery clinic in 2 months for 3 month follow up with repeat lumbar XR  -Call or return to neurosurgery office sooner if symptoms worsen or if new issues arise in the interim.     Electronically signed by Davian Patel PA-C on 2023 at 6:58 AM

## 2023-06-05 ENCOUNTER — TELEPHONE (OUTPATIENT)
Dept: NEUROSURGERY | Age: 68
End: 2023-06-05

## 2023-06-05 DIAGNOSIS — M43.16 SPONDYLOLISTHESIS OF LUMBAR REGION: ICD-10-CM

## 2023-06-05 RX ORDER — HYDROCODONE BITARTRATE AND ACETAMINOPHEN 5; 325 MG/1; MG/1
1 TABLET ORAL EVERY 4 HOURS PRN
Qty: 42 TABLET | Refills: 0 | Status: SHIPPED
Start: 2023-06-05 | End: 2023-06-09 | Stop reason: SDUPTHER

## 2023-06-05 NOTE — TELEPHONE ENCOUNTER
Patient really wants a call back from zak, she wants her advice on what to do regarding her left leg pain

## 2023-06-05 NOTE — TELEPHONE ENCOUNTER
Called patient back. She is having pain down her left leg. Only takes pain medication at night. She does have a medrol dosepack that she has not started. Refill Burton sent to pharmacy. Recommend taking medrol dosepack. All questions answered at this time.

## 2023-06-09 DIAGNOSIS — M43.16 SPONDYLOLISTHESIS OF LUMBAR REGION: ICD-10-CM

## 2023-06-09 RX ORDER — HYDROCODONE BITARTRATE AND ACETAMINOPHEN 5; 325 MG/1; MG/1
1 TABLET ORAL EVERY 4 HOURS PRN
Qty: 42 TABLET | Refills: 0 | Status: SHIPPED | OUTPATIENT
Start: 2023-06-09 | End: 2023-06-16

## 2023-07-18 ENCOUNTER — HOSPITAL ENCOUNTER (OUTPATIENT)
Dept: GENERAL RADIOLOGY | Age: 68
Discharge: HOME | End: 2023-07-20
Payer: MEDICARE

## 2023-07-18 ENCOUNTER — OFFICE VISIT (OUTPATIENT)
Dept: NEUROSURGERY | Age: 68
End: 2023-07-18
Payer: MEDICARE

## 2023-07-18 ENCOUNTER — HOSPITAL ENCOUNTER (OUTPATIENT)
Age: 68
Discharge: HOME | End: 2023-07-20
Payer: MEDICARE

## 2023-07-18 VITALS
WEIGHT: 140 LBS | TEMPERATURE: 97 F | SYSTOLIC BLOOD PRESSURE: 168 MMHG | BODY MASS INDEX: 28.22 KG/M2 | HEIGHT: 59 IN | HEART RATE: 74 BPM | OXYGEN SATURATION: 96 % | DIASTOLIC BLOOD PRESSURE: 99 MMHG

## 2023-07-18 DIAGNOSIS — M54.42 ACUTE MIDLINE LOW BACK PAIN WITH BILATERAL SCIATICA: ICD-10-CM

## 2023-07-18 DIAGNOSIS — Z98.1 S/P LUMBAR FUSION: ICD-10-CM

## 2023-07-18 DIAGNOSIS — M43.16 SPONDYLOLISTHESIS OF LUMBAR REGION: ICD-10-CM

## 2023-07-18 DIAGNOSIS — M54.41 ACUTE MIDLINE LOW BACK PAIN WITH BILATERAL SCIATICA: ICD-10-CM

## 2023-07-18 DIAGNOSIS — Z98.1 S/P LUMBAR FUSION: Primary | ICD-10-CM

## 2023-07-18 PROCEDURE — 99024 POSTOP FOLLOW-UP VISIT: CPT | Performed by: STUDENT IN AN ORGANIZED HEALTH CARE EDUCATION/TRAINING PROGRAM

## 2023-07-18 PROCEDURE — 72100 X-RAY EXAM L-S SPINE 2/3 VWS: CPT

## 2023-07-18 PROCEDURE — 99212 OFFICE O/P EST SF 10 MIN: CPT

## 2023-07-18 RX ORDER — NALOXONE HYDROCHLORIDE 4 MG/.1ML
SPRAY NASAL
COMMUNITY
Start: 2022-11-28

## 2023-07-18 RX ORDER — LISINOPRIL 20 MG/1
TABLET ORAL
COMMUNITY
Start: 2023-06-30

## 2023-07-18 NOTE — PROGRESS NOTES
Post Operative Follow-up     This is a 76year old female who presents to the office for a 3 month follow-up s/p L4-L5 PLIF     Subjective: Patient states she is doing well. She admits to some tailbone pain. She denies any significant back pain. No numbness or weakness with this pain. XR reviewed with patient. Physical Exam:              WDWN, no apparent distress              Non-labored breathing               Vitals Stable              Alert and oriented x3              CN 3-12 intact              PERRL              EOMI              AGUILERA well              Motor strength symmetric              Sensation to LT intact bilaterally   Incision healed well with no signs of infection                 Imagin2023 XR Lumbar Spine  Stable alignment, stable fusion. No acute abnormalities noted. Final report pending. Assessment: This is a 76 y.o.  female presenting for a 3 month follow-up s/p L4-L5 PLIF. Stable. Plan:  -Pain control and expectations discussed  -Can discontinue LSO brace and restrictions   -XR reviewed  -PT referral given  -OARRS report reviewed   -Follow-up in neurosurgery clinic in 9 months for 1 year follow up with repeat lumbar XR  -Call or return to neurosurgery office sooner if symptoms worsen or if new issues arise in the interim.     Electronically signed by Sarita Kruse PA-C on 2023 at 5:13 PM

## 2023-07-19 ENCOUNTER — TELEPHONE (OUTPATIENT)
Dept: NEUROSURGERY | Age: 68
End: 2023-07-19

## 2023-07-19 DIAGNOSIS — Z98.1 S/P LUMBAR FUSION: Primary | ICD-10-CM
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
## 2023-07-20 ENCOUNTER — HOSPITAL ENCOUNTER (OUTPATIENT)
Age: 68
Discharge: HOME | End: 2023-07-22

## 2023-07-20 ENCOUNTER — HOSPITAL ENCOUNTER (OUTPATIENT)
Dept: GENERAL RADIOLOGY | Age: 68
Discharge: HOME | End: 2023-07-22
Payer: COMMERCIAL

## 2023-07-20 DIAGNOSIS — Z98.1 S/P LUMBAR FUSION: ICD-10-CM

## 2023-07-20 PROCEDURE — 72100 X-RAY EXAM L-S SPINE 2/3 VWS: CPT

## 2023-07-21 ENCOUNTER — TELEPHONE (OUTPATIENT)
Dept: NEUROSURGERY | Age: 68
End: 2023-07-21
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
## 2023-07-28 ENCOUNTER — TELEPHONE (OUTPATIENT)
Dept: NEUROSURGERY | Age: 68
End: 2023-07-28

## 2023-07-28 DIAGNOSIS — Z98.1 S/P LUMBAR FUSION: Primary | ICD-10-CM
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
## 2023-08-01 ENCOUNTER — HOSPITAL ENCOUNTER (OUTPATIENT)
Age: 68
Discharge: HOME | End: 2023-08-03

## 2023-08-01 ENCOUNTER — HOSPITAL ENCOUNTER (OUTPATIENT)
Dept: GENERAL RADIOLOGY | Age: 68
Discharge: HOME | End: 2023-08-03
Payer: MEDICARE

## 2023-08-01 DIAGNOSIS — Z98.1 S/P LUMBAR FUSION: ICD-10-CM

## 2023-08-01 PROCEDURE — 72070 X-RAY EXAM THORAC SPINE 2VWS: CPT

## 2023-08-01 PROCEDURE — 72050 X-RAY EXAM NECK SPINE 4/5VWS: CPT

## 2023-08-03 ENCOUNTER — OFFICE VISIT (OUTPATIENT)
Dept: NEUROSURGERY | Age: 68
End: 2023-08-03
Payer: MEDICARE

## 2023-08-03 VITALS
WEIGHT: 140 LBS | DIASTOLIC BLOOD PRESSURE: 91 MMHG | OXYGEN SATURATION: 99 % | BODY MASS INDEX: 28.22 KG/M2 | SYSTOLIC BLOOD PRESSURE: 169 MMHG | TEMPERATURE: 97.9 F | HEART RATE: 78 BPM | HEIGHT: 59 IN

## 2023-08-03 DIAGNOSIS — M54.9 MID BACK PAIN: ICD-10-CM
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
## 2023-08-03 DIAGNOSIS — M54.2 NECK PAIN: Primary | ICD-10-CM

## 2023-08-03 PROCEDURE — 1123F ACP DISCUSS/DSCN MKR DOCD: CPT | Performed by: STUDENT IN AN ORGANIZED HEALTH CARE EDUCATION/TRAINING PROGRAM

## 2023-08-03 PROCEDURE — 99213 OFFICE O/P EST LOW 20 MIN: CPT | Performed by: STUDENT IN AN ORGANIZED HEALTH CARE EDUCATION/TRAINING PROGRAM

## 2023-08-03 PROCEDURE — 99212 OFFICE O/P EST SF 10 MIN: CPT
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
## 2023-09-22 ENCOUNTER — TELEPHONE (OUTPATIENT)
Dept: ORTHOPEDIC SURGERY | Age: 68
End: 2023-09-22

## 2023-09-22 NOTE — TELEPHONE ENCOUNTER
I called and spoke to patient and advised patient taht we received a referral from Dr. Darlyn Ogden 9/20/23 for Bilateral OA    Referral has been reviewed and patient is to be referred to Dr. Neal Samuels. Patient is aware and stated she will give their office a call if she does not get a call.

## 2023-10-25 ENCOUNTER — HOSPITAL ENCOUNTER (OUTPATIENT)
Dept: GENERAL RADIOLOGY | Age: 68
Discharge: HOME | End: 2023-10-27
Payer: MEDICARE

## 2023-10-25 VITALS — WEIGHT: 140 LBS | BODY MASS INDEX: 28.28 KG/M2

## 2023-10-25 DIAGNOSIS — Z12.31 ENCOUNTER FOR SCREENING MAMMOGRAM FOR MALIGNANT NEOPLASM OF BREAST: ICD-10-CM

## 2023-10-25 PROCEDURE — 77067 SCR MAMMO BI INCL CAD: CPT

## 2023-10-30 ENCOUNTER — TELEPHONE (OUTPATIENT)
Dept: NEUROSURGERY | Age: 68
End: 2023-10-30

## 2023-10-30 DIAGNOSIS — M54.16 LUMBAR RADICULOPATHY: Primary | ICD-10-CM
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
## 2023-10-31 ENCOUNTER — TELEPHONE (OUTPATIENT)
Dept: NEUROSURGERY | Age: 68
End: 2023-10-31

## 2023-10-31 PROBLEM — J31.0 CHRONIC RHINITIS: Status: ACTIVE | Noted: 2023-10-31

## 2023-10-31 PROBLEM — J32.2 CHRONIC ETHMOIDAL SINUSITIS: Status: ACTIVE | Noted: 2023-10-31

## 2023-10-31 PROBLEM — D49.0 PAROTID NEOPLASM: Status: ACTIVE | Noted: 2023-10-31

## 2023-10-31 PROBLEM — E06.3 HASHIMOTO'S THYROIDITIS: Status: ACTIVE | Noted: 2023-10-31

## 2023-10-31 PROBLEM — H81.20 VESTIBULAR NEURITIS: Status: ACTIVE | Noted: 2023-10-31

## 2023-10-31 PROBLEM — G57.63 MORTON'S METATARSALGIA, NEURALGIA, OR NEUROMA, BILATERAL: Status: ACTIVE | Noted: 2023-07-15

## 2023-10-31 PROBLEM — J32.0 RIGHT MAXILLARY SINUSITIS, CHRONIC: Status: ACTIVE | Noted: 2023-10-31

## 2023-10-31 PROBLEM — R42 INTERMITTENT VERTIGO: Status: ACTIVE | Noted: 2023-10-31

## 2023-10-31 PROBLEM — E55.9 VITAMIN D DEFICIENCY: Status: ACTIVE | Noted: 2023-10-31

## 2023-10-31 PROBLEM — F41.1 GENERALIZED ANXIETY DISORDER: Status: ACTIVE | Noted: 2023-10-31

## 2023-10-31 PROBLEM — K31.7 MULTIPLE GASTRIC POLYPS: Status: ACTIVE | Noted: 2023-10-31

## 2023-10-31 PROBLEM — R23.2 HOT FLASHES: Status: ACTIVE | Noted: 2023-10-31

## 2023-10-31 RX ORDER — MELOXICAM 15 MG/1
TABLET ORAL
COMMUNITY
Start: 2023-10-05

## 2023-10-31 RX ORDER — IBUPROFEN 800 MG/1
TABLET ORAL
COMMUNITY
Start: 2023-10-21
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
## 2023-11-02 ENCOUNTER — TELEPHONE (OUTPATIENT)
Dept: NEUROSURGERY | Age: 68
End: 2023-11-02

## 2023-11-02 NOTE — TELEPHONE ENCOUNTER
Site: [employer]   Insurance: [facility] [employer]  Order: Lumbar CT Myelogram  Request# [id number]  Status: PENDING    Documents scanned into media

## 2023-11-06 DIAGNOSIS — M54.9 MID BACK PAIN: ICD-10-CM

## 2023-11-06 DIAGNOSIS — Z01.818 PRE-OP TESTING: ICD-10-CM

## 2023-11-06 DIAGNOSIS — M54.42 ACUTE MIDLINE LOW BACK PAIN WITH BILATERAL SCIATICA: ICD-10-CM

## 2023-11-06 DIAGNOSIS — M54.2 NECK PAIN: Primary | ICD-10-CM

## 2023-11-06 DIAGNOSIS — M43.16 SPONDYLOLISTHESIS OF LUMBAR REGION: ICD-10-CM

## 2023-11-06 DIAGNOSIS — Z98.1 S/P LUMBAR FUSION: ICD-10-CM

## 2023-11-06 DIAGNOSIS — M54.41 ACUTE MIDLINE LOW BACK PAIN WITH BILATERAL SCIATICA: ICD-10-CM

## 2023-11-07 DIAGNOSIS — M54.9 MID BACK PAIN: ICD-10-CM
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
## 2023-11-07 DIAGNOSIS — Z01.818 PRE-OP TESTING: ICD-10-CM

## 2023-11-07 DIAGNOSIS — Z98.1 S/P LUMBAR FUSION: ICD-10-CM

## 2023-11-07 DIAGNOSIS — M54.2 NECK PAIN: Primary | ICD-10-CM

## 2023-11-24 ENCOUNTER — HOSPITAL ENCOUNTER (OUTPATIENT)
Age: 68
Discharge: HOME | End: 2023-11-24
Payer: MEDICARE

## 2023-11-24 DIAGNOSIS — Z98.1 S/P LUMBAR FUSION: ICD-10-CM

## 2023-11-24 DIAGNOSIS — M43.16 SPONDYLOLISTHESIS OF LUMBAR REGION: ICD-10-CM
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
## 2023-11-24 DIAGNOSIS — M54.9 MID BACK PAIN: ICD-10-CM

## 2023-11-24 DIAGNOSIS — M54.42 ACUTE MIDLINE LOW BACK PAIN WITH BILATERAL SCIATICA: ICD-10-CM

## 2023-11-24 DIAGNOSIS — Z01.818 PRE-OP TESTING: ICD-10-CM

## 2023-11-24 DIAGNOSIS — M54.41 ACUTE MIDLINE LOW BACK PAIN WITH BILATERAL SCIATICA: ICD-10-CM

## 2023-11-24 DIAGNOSIS — M54.2 NECK PAIN: ICD-10-CM

## 2023-11-24 LAB
CREAT SERPL-MCNC: 1 MG/DL (ref 0.5–1)
GFR SERPL CREATININE-BSD FRML MDRD: >60 ML/MIN/1.73M2
INR PPP: 1
PLATELET # BLD AUTO: 233 K/UL (ref 130–450)
PROTHROMBIN TIME: 11.1 SEC (ref 9.3–12.4)

## 2023-11-24 PROCEDURE — 36415 COLL VENOUS BLD VENIPUNCTURE: CPT

## 2023-11-24 PROCEDURE — 82565 ASSAY OF CREATININE: CPT

## 2023-11-24 PROCEDURE — 85049 AUTOMATED PLATELET COUNT: CPT

## 2023-11-24 PROCEDURE — 85610 PROTHROMBIN TIME: CPT

## 2023-11-28 ENCOUNTER — HOSPITAL ENCOUNTER (OUTPATIENT)
Dept: CT IMAGING | Age: 68
Discharge: HOME | End: 2023-11-30
Payer: MEDICARE

## 2023-11-28 ENCOUNTER — HOSPITAL ENCOUNTER (OUTPATIENT)
Dept: GENERAL RADIOLOGY | Age: 68
Discharge: HOME | End: 2023-11-30
Payer: MEDICARE

## 2023-11-28 VITALS
DIASTOLIC BLOOD PRESSURE: 68 MMHG | OXYGEN SATURATION: 99 % | RESPIRATION RATE: 20 BRPM | HEART RATE: 60 BPM | SYSTOLIC BLOOD PRESSURE: 145 MMHG

## 2023-11-28 VITALS
OXYGEN SATURATION: 99 % | DIASTOLIC BLOOD PRESSURE: 73 MMHG | RESPIRATION RATE: 20 BRPM | SYSTOLIC BLOOD PRESSURE: 151 MMHG | HEART RATE: 60 BPM

## 2023-11-28 DIAGNOSIS — M54.16 LUMBAR RADICULOPATHY: ICD-10-CM

## 2023-11-28 PROCEDURE — 2709999900 FL MYELOGRAM LUMBOSACRAL S&I

## 2023-11-28 PROCEDURE — 6360000004 HC RX CONTRAST MEDICATION: Performed by: PHYSICIAN ASSISTANT

## 2023-11-28 PROCEDURE — 7100000011 FL MYELOGRAM LUMBOSACRAL S&I

## 2023-11-28 PROCEDURE — 72132 CT LUMBAR SPINE W/DYE: CPT

## 2023-11-28 RX ORDER — IOPAMIDOL 408 MG/ML
12 INJECTION, SOLUTION INTRATHECAL
Status: COMPLETED | OUTPATIENT
Start: 2023-11-28 | End: 2023-11-28

## 2023-11-28 RX ADMIN — IOPAMIDOL 12 ML: 408 INJECTION, SOLUTION INTRATHECAL at 10:09

## 2023-11-28 NOTE — PROGRESS NOTES
1030- Patient returned from procedure. Dressing checked, clean, dry, and intact. Patient stable. No s/s of complications noted or reported. Vitals will be checked see flow sheets. Patient eating and drinking well with no s/s of complications noted or reported. Patient discharged, site was checked with every set of vitals. Site clean dry and intact. Discharge papers reviewed with patient, questions answered, discharge paper signed. Patient taken to door via ***. Patient in stable condition, no s/s of complications noted or reported.

## 2023-11-28 NOTE — BRIEF OP NOTE
Brief-Op Note  ______________________________________________________________      IR/FL LUMBAR PUNCTURE WITH CONTRAST  INJECTION   FOR CT RAINBOW [facility]  [address]      Patient Name: Arley Lamb   YOB: 1955  Medical Record Number: [id number]  Date of Procedure: 11/28/23  Room/Bed: Room/bed info not found    Preoperative diagnosis: Lumbar Radiculopathy (M54.16 [ICD-10-CM]), Prior Lumbar Fusion (Z98.1 [ICD-10-CM]), and Chronic Low Back Pain (M54.41 [ICD-10-CM])     Postoperative diagnosis: Same    Consent: Informed consent was obtained from the patient prior to the procedure. Procedure Type: Fluoroscopic-guided lumbar puncture with contrast injection for CT Myelogram Imaging of lumbar Spine. Anesthesia: Local anesthesia administered subcutaneously.     Performed by: VANI Woodruff under on-site supervision by Meryl Flynn MD.    Estimated blood loss: None    Complications: None    Implants: None    Electronically signed by VANI Woodruff   DD: 11/28/23  11:15 AM

## 2023-11-28 NOTE — H&P
BEDTIME AS NEEDED, Disp: , Rfl: 0  Allergy:   Allergies   Allergen Reactions    Penicillins Hives and Swelling     Thoat closes     Past Medical History:   Diagnosis Date    GERD (gastroesophageal reflux disease)     Hiatal hernia     HLD (hyperlipidemia)     HTN (hypertension)     Osteopenia     Spinal stenosis      Past Surgical History:   Procedure Laterality Date    BREAST REDUCTION SURGERY  1981    DILATION AND CURETTAGE OF UTERUS  1979    FOOT SURGERY  11/12/2014    clay's neuroma    LUMBAR DISCECTOMY  05/13/2015    LUMBAR SPINE SURGERY N/A 4/25/2023    L4-L5 POSTERIOR LUMBAR INTERBODY FUSION performed by Britton Lopez MD at [address]      x2 1987, 2014    UPPER GASTROINTESTINAL ENDOSCOPY       Family History   Problem Relation Age of Onset    Cancer Mother         blood    Hypothyroidism Mother     Cancer Father         blood    No Known Problems Sister     No Known Problems Daughter     Other Daughter         hyperparathyroidism     Social History     Socioeconomic History    Marital status:      Spouse name: John Reyes    Number of children: 2    Years of education: Not on file    Highest education level: Not on file   Occupational History    Occupation: disability     Comment:    Tobacco Use    Smoking status: Never    Smokeless tobacco: Never   Vaping Use    Vaping Use: Never used   Substance and Sexual Activity    Alcohol use: Not Currently    Drug use: Not Currently    Sexual activity: Not on file   Other Topics Concern    Not on file   Social History Narrative    Grew up in [place] and lives in [email or web address]        [facility]     Social Determinants of Health     Financial Resource Strain: Not on file   Food Insecurity: Not on file   Transportation Needs: Not on file   Physical Activity: Not on file   Stress: Not on file   Social Connections: Not on file   Intimate Partner Violence: Not on file   Housing Stability: Not on file       Vital Signs:  Vitals:

## 2023-11-28 NOTE — OP NOTE
Operative Note  ______________________________________________________________      IR/FL LUMBAR PUNCTURE WITH CONTRAST  INJECTION FOR CT [facility]  [address]    Patient Name: Tonita Cushing   YOB: 1955  Medical Record Number: [id number]  Date of Procedure: 11/28/23  Room/Bed: Room/bed info not found    Preoperative diagnosis: Lumbar Radiculopathy (M54.16 [ICD-10-CM]), Prior Lumbar Fusion (Z98.1 [ICD-10-CM]), and Chronic Low Back Pain (M54.41 [ICD-10-CM])     Postoperative diagnosis: Same    Consent: Informed consent was obtained from the patient prior to the procedure. The details of the procedure, as well is its risks, benefits, and alternatives, were explained. These risks include, but are not limited to, nerve root injury, bleeding, infection (including meningitis), reaction to contrast used, dural leak (possibly requiring blood patch treatment), and spinal headache. Opportunity was provided to ask questions, which were answered. Procedure Type: Fluoroscopic-guided lumbar puncture with contrast injection for CT Myelogram Imaging of lumbar Spine. Anesthesia: LLocal anesthesia with approximately 5 mL of 1% Lidocaine without epinephrine administered subcutaneously administered subcutaneously. Performed by: VANI Bonds under on-site supervision by Tiny Houston MD.    Estimated blood loss: None    Complications: None    Implants: None    Procedure: The appropriate labs reviewed including INR and PT. A site under fluoroscopy guidance at the level of the L4-L5 interspace was selected. This site was prepped in usual sterile fashion. 1% lidocaine local injection was used for local anesthesia. A 3.5 in. 20g spinal needle inserted into thecal sac. Subsequently 12 mL of Isovue M 200 contrast was injected. Final needle placement and contrast within the thecal sac was confirmed and images were saved into PACs. The patient tolerated the procedure.   There were no

## 2023-11-29 DIAGNOSIS — G95.89 LUMBAR EPIDURAL MASS (HCC): Primary | ICD-10-CM

## 2024-04-24 ENCOUNTER — HOSPITAL ENCOUNTER (OUTPATIENT)
Dept: GENERAL RADIOLOGY | Age: 69
Discharge: HOME | End: 2024-04-26
Payer: MEDICARE

## 2024-04-24 ENCOUNTER — HOSPITAL ENCOUNTER (OUTPATIENT)
Dept: ULTRASOUND IMAGING | Age: 69
Discharge: HOME | End: 2024-04-26
Payer: MEDICARE

## 2024-04-24 VITALS — WEIGHT: 143 LBS | BODY MASS INDEX: 28.88 KG/M2

## 2024-04-24 DIAGNOSIS — Z78.0 MENOPAUSE: ICD-10-CM

## 2024-04-24 DIAGNOSIS — E04.1 NONTOXIC UNINODULAR GOITER: ICD-10-CM

## 2024-04-24 PROCEDURE — 77080 DXA BONE DENSITY AXIAL: CPT

## 2024-04-24 PROCEDURE — 76536 US EXAM OF HEAD AND NECK: CPT

## 2024-04-29 ENCOUNTER — TELEPHONE (OUTPATIENT)
Dept: NEUROSURGERY | Age: 69
End: 2024-04-29

## 2024-04-29 DIAGNOSIS — M54.42 CHRONIC BILATERAL LOW BACK PAIN WITH LEFT-SIDED SCIATICA: Primary | ICD-10-CM

## 2024-04-29 DIAGNOSIS — M54.16 LUMBAR RADICULOPATHY: ICD-10-CM

## 2024-04-29 DIAGNOSIS — G93.0 ARACHNOID CYST: ICD-10-CM

## 2024-04-29 DIAGNOSIS — G89.29 CHRONIC BILATERAL LOW BACK PAIN WITH LEFT-SIDED SCIATICA: Primary | ICD-10-CM

## 2024-04-29 NOTE — TELEPHONE ENCOUNTER
Patient needs MRI-but needs an order with anesthesia . Please place new order.     Patient was notified.

## 2024-06-12 ENCOUNTER — TELEPHONE (OUTPATIENT)
Dept: INTERVENTIONAL RADIOLOGY/VASCULAR | Age: 69
End: 2024-06-12

## 2024-06-12 NOTE — PRE-PROCEDURE INSTRUCTIONS
* HIPAA compliant voicemail left for patient regarding ordered procedure         *  notified to arrive to [facility]/outpatient services @ preprocedural arrival time 0730           *  notified to be NPO after midnight the night before the procedure         *  notified to wear comfortable clothing and if ride is necessary for procedure         *  department phone number left for return call for any questions the patient may have.         *  chart checked and any necessary orders placed in computer

## 2024-06-18 ENCOUNTER — HOSPITAL ENCOUNTER (OUTPATIENT)
Age: 69
Discharge: HOME | End: 2024-06-18
Payer: MEDICARE

## 2024-06-18 DIAGNOSIS — M54.42 CHRONIC BILATERAL LOW BACK PAIN WITH LEFT-SIDED SCIATICA: ICD-10-CM

## 2024-06-18 DIAGNOSIS — M54.16 LUMBAR RADICULOPATHY: ICD-10-CM

## 2024-06-18 DIAGNOSIS — G89.29 CHRONIC BILATERAL LOW BACK PAIN WITH LEFT-SIDED SCIATICA: ICD-10-CM

## 2024-06-18 DIAGNOSIS — G93.0 ARACHNOID CYST: ICD-10-CM

## 2024-06-18 LAB
BUN SERPL-MCNC: 25 MG/DL (ref 6–23)
CREAT SERPL-MCNC: 0.9 MG/DL (ref 0.5–1)
GFR, ESTIMATED: 70 ML/MIN/1.73M2

## 2024-06-18 PROCEDURE — 82565 ASSAY OF CREATININE: CPT

## 2024-06-18 PROCEDURE — 84520 ASSAY OF UREA NITROGEN: CPT

## 2024-06-18 PROCEDURE — 36415 COLL VENOUS BLD VENIPUNCTURE: CPT

## 2024-06-19 ENCOUNTER — HOSPITAL ENCOUNTER (OUTPATIENT)
Dept: MRI IMAGING | Age: 69
Discharge: HOME | End: 2024-06-21
Payer: MEDICARE

## 2024-06-19 ENCOUNTER — ANESTHESIA EVENT (OUTPATIENT)
Dept: MRI IMAGING | Age: 69
End: 2024-06-19
Payer: MEDICARE

## 2024-06-19 ENCOUNTER — ANESTHESIA (OUTPATIENT)
Dept: MRI IMAGING | Age: 69
End: 2024-06-19
Payer: MEDICARE

## 2024-06-19 VITALS
DIASTOLIC BLOOD PRESSURE: 60 MMHG | SYSTOLIC BLOOD PRESSURE: 109 MMHG | OXYGEN SATURATION: 96 % | RESPIRATION RATE: 17 BRPM | HEART RATE: 78 BPM

## 2024-06-19 DIAGNOSIS — M54.42 CHRONIC BILATERAL LOW BACK PAIN WITH LEFT-SIDED SCIATICA: ICD-10-CM

## 2024-06-19 DIAGNOSIS — M54.16 LUMBAR RADICULOPATHY: ICD-10-CM

## 2024-06-19 DIAGNOSIS — G89.29 CHRONIC BILATERAL LOW BACK PAIN WITH LEFT-SIDED SCIATICA: ICD-10-CM

## 2024-06-19 DIAGNOSIS — G93.0 ARACHNOID CYST: ICD-10-CM

## 2024-06-19 PROCEDURE — 36415 COLL VENOUS BLD VENIPUNCTURE: CPT

## 2024-06-19 PROCEDURE — 6360000002 HC RX W HCPCS

## 2024-06-19 PROCEDURE — 72158 MRI LUMBAR SPINE W/O & W/DYE: CPT

## 2024-06-19 PROCEDURE — 6360000004 HC RX CONTRAST MEDICATION: Performed by: RADIOLOGY

## 2024-06-19 PROCEDURE — 2500000003 HC RX 250 WO HCPCS

## 2024-06-19 PROCEDURE — A9579 GAD-BASE MR CONTRAST NOS,1ML: HCPCS | Performed by: RADIOLOGY

## 2024-06-19 PROCEDURE — 7100000010 HC PHASE II RECOVERY - FIRST 15 MIN

## 2024-06-19 PROCEDURE — 2580000003 HC RX 258

## 2024-06-19 PROCEDURE — 7100000011 HC PHASE II RECOVERY - ADDTL 15 MIN

## 2024-06-19 RX ORDER — MIDAZOLAM HYDROCHLORIDE 1 MG/ML
INJECTION INTRAMUSCULAR; INTRAVENOUS PRN
Status: DISCONTINUED | OUTPATIENT
Start: 2024-06-19 | End: 2024-06-19 | Stop reason: SDUPTHER

## 2024-06-19 RX ORDER — SODIUM CHLORIDE 9 MG/ML
INJECTION, SOLUTION INTRAVENOUS CONTINUOUS PRN
Status: DISCONTINUED | OUTPATIENT
Start: 2024-06-19 | End: 2024-06-19 | Stop reason: SDUPTHER

## 2024-06-19 RX ORDER — GLYCOPYRROLATE 1 MG/5 ML
SYRINGE (ML) INTRAVENOUS PRN
Status: DISCONTINUED | OUTPATIENT
Start: 2024-06-19 | End: 2024-06-19 | Stop reason: SDUPTHER

## 2024-06-19 RX ORDER — FENTANYL CITRATE 50 UG/ML
INJECTION, SOLUTION INTRAMUSCULAR; INTRAVENOUS PRN
Status: DISCONTINUED | OUTPATIENT
Start: 2024-06-19 | End: 2024-06-19 | Stop reason: SDUPTHER

## 2024-06-19 RX ORDER — DEXMEDETOMIDINE HYDROCHLORIDE 100 UG/ML
INJECTION, SOLUTION INTRAVENOUS PRN
Status: DISCONTINUED | OUTPATIENT
Start: 2024-06-19 | End: 2024-06-19 | Stop reason: SDUPTHER

## 2024-06-19 RX ORDER — PROPOFOL 10 MG/ML
INJECTION, EMULSION INTRAVENOUS CONTINUOUS PRN
Status: DISCONTINUED | OUTPATIENT
Start: 2024-06-19 | End: 2024-06-19 | Stop reason: SDUPTHER

## 2024-06-19 RX ADMIN — GADOTERIDOL 13 ML: 279.3 INJECTION, SOLUTION INTRAVENOUS at 08:52

## 2024-06-19 RX ADMIN — Medication 0.4 MG: at 08:22

## 2024-06-19 RX ADMIN — SODIUM CHLORIDE: 9 INJECTION, SOLUTION INTRAVENOUS at 07:47

## 2024-06-19 RX ADMIN — DEXMEDETOMIDINE HCL 8 MCG: 100 INJECTION INTRAVENOUS at 08:17

## 2024-06-19 RX ADMIN — DEXMEDETOMIDINE HCL 8 MCG: 100 INJECTION INTRAVENOUS at 08:16

## 2024-06-19 RX ADMIN — DEXMEDETOMIDINE HCL 8 MCG: 100 INJECTION INTRAVENOUS at 08:13

## 2024-06-19 RX ADMIN — PHENYLEPHRINE HYDROCHLORIDE 100 MCG: 10 INJECTION INTRAVENOUS at 08:22

## 2024-06-19 RX ADMIN — MIDAZOLAM 2 MG: 1 INJECTION INTRAMUSCULAR; INTRAVENOUS at 08:13

## 2024-06-19 RX ADMIN — FENTANYL CITRATE 50 MCG: 50 INJECTION INTRAMUSCULAR; INTRAVENOUS at 08:13

## 2024-06-19 RX ADMIN — PROPOFOL 25.64 MCG/KG/MIN: 10 INJECTION, EMULSION INTRAVENOUS at 08:14

## 2024-06-19 NOTE — ANESTHESIA PRE PROCEDURE
Department of Anesthesiology  Preprocedure Note       Name:  Tricia Best   Age:  69 y.o.  :  1955                                          MRN:  [id number]         Date:  2024      Surgeon: * Surgery not found *    Procedure: MRI w/ ANES    Medications prior to admission:   Prior to Admission medications    Medication Sig Start Date End Date Taking? Authorizing Provider   meloxicam (MOBIC) 15 MG tablet TAKE ONE TABLET BY MOUTH EVERY DAY WITH FOOD 10/5/23   Bennie Bynum MD   ibuprofen (ADVIL;MOTRIN) 800 MG tablet  10/21/23   Bennie Bynum MD   lisinopril (PRINIVIL;ZESTRIL) 20 MG tablet  23   Bennie Bynum MD   naloxone 4 MG/0.1ML LIQD nasal spray as directed Nasally in the event of overdose for 1 days 22   Bennie Bynum MD   famotidine (PEPCID) 40 MG tablet  3/21/23   Bennie Bynum MD   rosuvastatin (CRESTOR) 20 MG tablet Take 1 tablet by mouth daily    Bennie Bynum MD   acetaminophen (TYLENOL) 325 MG tablet Take 2 tablets by mouth every 6 hours as needed for Pain  Patient not taking: Reported on 2023    Bennie Bynum MD   NONFORMULARY Take 1,000 mg by mouth 3 times daily Red marine Calcium with Magnesium    Bennie Bynum MD   fluticasone (FLONASE) 50 MCG/ACT nasal spray 1-2 sprays by Nasal route as needed 3/18/22   Bennie Bynum MD   Cholecalciferol (VITAMIN D3) 5000 units TABS Take 0.5 tablets by mouth every other day    Bennie Bynum MD   zolpidem (AMBIEN) 5 MG tablet TAKE 1 TABLET BY MOUTH EVERY DAY AT BEDTIME AS NEEDED 19   Bennie Bynum MD       Current medications:    Current Outpatient Medications   Medication Sig Dispense Refill    meloxicam (MOBIC) 15 MG tablet TAKE ONE TABLET BY MOUTH EVERY DAY WITH FOOD      ibuprofen (ADVIL;MOTRIN) 800 MG tablet       lisinopril (PRINIVIL;ZESTRIL) 20 MG tablet       naloxone 4 MG/0.1ML LIQD nasal spray as directed Nasally in the event of overdose

## 2024-06-19 NOTE — PROGRESS NOTES
Arrival to recovery area following MRI with sedation. Initial assessment reveals the dressing at the procedure site is clean, dry, and intact. No signs of complications noted at the site. Currently stable, resting comfortably, and no signs or symptoms of complications are observed at this time.    Vital signs will continue to be monitored closely and documented every 15 minutes, as per the post-procedure protocol. Refer to the flow sheets for detailed vital sign trends.    Neurovascular status is consistent with pre-procedure assessment.     The patient is oriented to baseline. Patient verbalizes understanding of post-procedure care instructions.    Plan to continue close monitoring in the recovery area for 30 minutes before reassessing for potential discharge to private residence.

## 2024-06-19 NOTE — ANESTHESIA POSTPROCEDURE EVALUATION
Department of Anesthesiology  Postprocedure Note    Patient: Tricia Best  MRN: [id number]  YOB: 1955  Date of evaluation: 6/19/2024    Procedure Summary       Date: 06/19/24 Room / Location: [facility] MRI; [facility] Special Procedures; [facility] Radiology    Anesthesia Start: 0807 Anesthesia Stop: 0850    Procedure: MRI LUMBAR SPINE W WO CONTRAST Diagnosis:       Chronic bilateral low back pain with left-sided sciatica      Lumbar radiculopathy      Arachnoid cyst    Scheduled Providers: Frankie Wagner APRN - CRNA Responsible Provider: Ever Segovia DO    Anesthesia Type: MAC ASA Status: 3            Anesthesia Type: No value filed.    Estefania Phase I:      Estefania Phase II: Estefania Score: 10    Anesthesia Post Evaluation    Patient location during evaluation: bedside  Patient participation: complete - patient cannot participate  Level of consciousness: awake and alert  Airway patency: patent  Nausea & Vomiting: no nausea and no vomiting  Cardiovascular status: blood pressure returned to baseline  Respiratory status: acceptable  Hydration status: euvolemic  Pain management: adequate    No notable events documented.

## 2024-06-19 NOTE — DISCHARGE SUMMARY
Special Procedures Discharge Summary    Tolerating eating and drinking well, displaying no S/S of complications. Vital signs have been checked regularly. The procedure site monitored and remains clean, dry, and intact.     Detailed education regarding dressing care, emphasizing the importance of maintaining it clean and dry, was provided. The patient was informed about potential signs of infection, such as redness, increased pain, swelling, or discharge, and was instructed to promptly report any such symptoms.     Discharge instructions were thoroughly reviewed with the patient, and all their questions were answered satisfactorily. The patient/family is in possession of discharge papers and follow up information including contact information if needed. The patient/family confirms understanding and receipt of all necessary information.     Escorted to outside of department via independent ambulation. At the time of discharge, the patient is in stable condition, exhibiting no signs or symptoms of complications. The patient states they are not operating a motor vehicle post-procedure and care has been transferred to a safe, responsible party for transport to private residence.

## 2024-06-26 ENCOUNTER — OFFICE VISIT (OUTPATIENT)
Dept: NEUROSURGERY | Age: 69
End: 2024-06-26
Payer: MEDICARE

## 2024-06-26 VITALS — DIASTOLIC BLOOD PRESSURE: 86 MMHG | SYSTOLIC BLOOD PRESSURE: 155 MMHG | OXYGEN SATURATION: 98 % | HEART RATE: 82 BPM

## 2024-06-26 DIAGNOSIS — G89.29 CHRONIC BILATERAL LOW BACK PAIN WITHOUT SCIATICA: Primary | ICD-10-CM

## 2024-06-26 DIAGNOSIS — M54.50 CHRONIC BILATERAL LOW BACK PAIN WITHOUT SCIATICA: Primary | ICD-10-CM

## 2024-06-26 DIAGNOSIS — Z98.1 S/P LUMBAR FUSION: ICD-10-CM

## 2024-06-26 PROCEDURE — 99213 OFFICE O/P EST LOW 20 MIN: CPT

## 2024-06-26 PROCEDURE — 1123F ACP DISCUSS/DSCN MKR DOCD: CPT | Performed by: STUDENT IN AN ORGANIZED HEALTH CARE EDUCATION/TRAINING PROGRAM

## 2024-06-26 PROCEDURE — 3077F SYST BP >= 140 MM HG: CPT | Performed by: STUDENT IN AN ORGANIZED HEALTH CARE EDUCATION/TRAINING PROGRAM

## 2024-06-26 PROCEDURE — 99213 OFFICE O/P EST LOW 20 MIN: CPT | Performed by: STUDENT IN AN ORGANIZED HEALTH CARE EDUCATION/TRAINING PROGRAM

## 2024-06-26 PROCEDURE — 3079F DIAST BP 80-89 MM HG: CPT | Performed by: STUDENT IN AN ORGANIZED HEALTH CARE EDUCATION/TRAINING PROGRAM

## 2024-06-26 NOTE — PROGRESS NOTES
Problem Focused Office Visit     Subjective: Tricia Best is a 68 y.o.  female who has a past medical history of L4-L5 PLIF by Dr. Castellano on 2023. Patient was last seen about 6 months ago when MRI showed arachnoid cyst at L2 level. Patient presents today for 6 month follow up/review imaging.     Patient states she is doing okay today. She admits to continued low back pain. She describes this pain as an aching pain. She denies any radiation of this pain into her legs. She has tried PT at [facility] and [facility] with no relief. She has tried [facility] in the past, none recently. She is a nonsmoker and denies any blood thinner usage. MRI reviewed with patient.      Physical Exam  HENT:      Head: Normocephalic.   Eyes:      Pupils: Pupils are equal, round, and reactive to light.   Cardiovascular:      Rate and Rhythm: Normal rate.   Pulmonary:      Effort: Pulmonary effort is normal.   Abdominal:      General: There is no distension.   Musculoskeletal:         General: Normal range of motion.      Cervical back: Normal range of motion.   Skin:     General: Skin is warm and dry.   Neurological:      Mental Status: She is alert.      Comments: A&Ox3  CN3-12 intact  Motor Strength full   Sensation intact to light touch   Reflexes normal   (-)Bilateral Straight leg test   Psychiatric:         Thought Content: Thought content normal.                 Imagin2024 MRI Lumbar Spine W/WO Contrast   1. Degenerative and postsurgical changes as described above.  2. Moderate central canal stenosis at L3-4 with moderate to severe right and moderate left neural foraminal stenosis.  These findings are worse compared to the prior study.  3. Posterior epidural lesion seen previously at the L2 level has resolved. No significant central canal stenosis seen at this level.     Assessment: This is a 69 y.o.  female presenting for office follow up/review MRI results for arachnoid cyst. Patient still with low back pain. Has

## 2024-07-23 ENCOUNTER — OFFICE VISIT (OUTPATIENT)
Dept: PAIN MANAGEMENT | Age: 69
End: 2024-07-23
Payer: MEDICARE

## 2024-07-23 VITALS
BODY MASS INDEX: 28.83 KG/M2 | HEART RATE: 100 BPM | HEIGHT: 59 IN | OXYGEN SATURATION: 98 % | TEMPERATURE: 97.1 F | RESPIRATION RATE: 18 BRPM | SYSTOLIC BLOOD PRESSURE: 170 MMHG | WEIGHT: 143 LBS | DIASTOLIC BLOOD PRESSURE: 90 MMHG

## 2024-07-23 DIAGNOSIS — M51.36 DDD (DEGENERATIVE DISC DISEASE), LUMBAR: Primary | ICD-10-CM

## 2024-07-23 DIAGNOSIS — M47.817 LUMBOSACRAL SPONDYLOSIS WITHOUT MYELOPATHY: ICD-10-CM

## 2024-07-23 DIAGNOSIS — M96.1 POSTLAMINECTOMY SYNDROME, LUMBAR: ICD-10-CM

## 2024-07-23 DIAGNOSIS — M54.16 LUMBAR RADICULAR PAIN: ICD-10-CM

## 2024-07-23 DIAGNOSIS — M48.061 SPINAL STENOSIS OF LUMBAR REGION, UNSPECIFIED WHETHER NEUROGENIC CLAUDICATION PRESENT: ICD-10-CM

## 2024-07-23 PROCEDURE — 1123F ACP DISCUSS/DSCN MKR DOCD: CPT | Performed by: ANESTHESIOLOGY

## 2024-07-23 PROCEDURE — 99204 OFFICE O/P NEW MOD 45 MIN: CPT | Performed by: ANESTHESIOLOGY

## 2024-07-23 PROCEDURE — 3077F SYST BP >= 140 MM HG: CPT | Performed by: ANESTHESIOLOGY

## 2024-07-23 PROCEDURE — 3080F DIAST BP >= 90 MM HG: CPT | Performed by: ANESTHESIOLOGY

## 2024-07-23 RX ORDER — DULOXETIN HYDROCHLORIDE 30 MG/1
30 CAPSULE, DELAYED RELEASE ORAL DAILY
Qty: 30 CAPSULE | Refills: 1 | Status: SHIPPED | OUTPATIENT
Start: 2024-07-23

## 2024-07-23 RX ORDER — OFLOXACIN 3 MG/ML
SOLUTION/ DROPS OPHTHALMIC
COMMUNITY
Start: 2024-04-30

## 2024-07-23 RX ORDER — MONTELUKAST SODIUM 10 MG/1
10 TABLET ORAL DAILY
COMMUNITY
Start: 2024-06-04

## 2024-07-23 NOTE — PROGRESS NOTES
Patient:  Tricia Best,  1955  Date of Service:  24      Patient presents to [facility] with complaints of back pain that started 15 years ago and has been getting worse.     She states the pain began following No specific cause    Pain is constant and is described as aching, throbbing, and burning. She rates the pain as a 10/10 on her worst day , 5/10 on her best day, and a 5/10 on average on the VAS scale.     Pain does radiate to right leg and left leg. She  has numbness, tingling, weakness of the right leg and left leg.    Alleviating factors include: heat, ice, rest, and Ibuprofen.  Aggravating factors include:  walking, standing, bending, lifting. She states that the pain does keep her from sleeping at night. She took her last dose of  800mg Ibuprofen  every morning and and HS.     She is  on NSAIDS and  is not on anticoagulation medications to include none  Previous treatments: Physical Therapy, Surgery last year, and medications..      Personal Expectations from this treatment: increase activity and decrease pain    Resp 18   Ht 1.499 m (4' 11.02\")   Wt 64.9 kg (143 lb)   BMI 28.87 kg/m²     No LMP recorded. Patient is postmenopausal.  
and is not painful.    Lumbar spine:    Spine inspection: Scar seen  Palpation: Tenderness paravertebral muscles Yes bilaterally  Range of motion: Decreased, flexion Decreased, Lateral bending, extension and rotation bilaterally reduced is painful.  Sacroiliac joint tenderness No bilaterally  JOHN test: negative bilaterally  Gaenslen's test:negative bilaterally   Piriformis tenderness: negative bilaterally  SLR : negative bilaterally    Musculoskeletal:    Trigger points No     Extremities:    Tremors:None bilaterally upper and lower  Edema:no    Neurological:    Sensory: Normal to light touch     Motor:   Right  5/5              Left  5/5               Right Bicep 5/5           Left Bicep 5/5              Right Triceps 5/5       Left Triceps 5/5          Right Deltoid 5/5     Left Deltoid 5/5                  Right Quadriceps 5/5          Left Quadriceps 5/5           Right Gastrocnemius 5/5    Left Gastrocnemius 5/5  Right Ant Tibialis 5/5  Left Ant Tibialis 5/5    Dermatology:    Skin:no rashes or lesions noted    Assessment/Plan:     Diagnosis Orders   1. DDD (degenerative disc disease), lumbar        2. Lumbar radicular pain        3. Lumbosacral spondylosis without myelopathy        4. Postlaminectomy syndrome, lumbar        5. Spinal stenosis of lumbar region, unspecified whether neurogenic claudication present          69 y.o. female with h/o Chronic low back and LE pain.    S/P L4-5 PLIF- helped initially.    Has low back and LE pain. Failed conservative treatment.    Recent MRI findings and prior MRI/ CT myelogram findings reviewed.    Has been evaluated  NSG- patient not interested in any more spine surgeries.    Has tried Gabapentin/ Lyrica- had significant weight gain.    PLAN:  Discussed various treatment options.    Start low dose Cymbalta. Use instructions side effects reviewed.    Discussed about TFESI above the fusion. RBA discussed.  May need moderate sedation due to anxiety of

## 2024-09-06 ENCOUNTER — OFFICE VISIT (OUTPATIENT)
Dept: PAIN MANAGEMENT | Age: 69
End: 2024-09-06
Payer: MEDICARE

## 2024-09-06 VITALS
TEMPERATURE: 97.1 F | SYSTOLIC BLOOD PRESSURE: 110 MMHG | HEIGHT: 59 IN | OXYGEN SATURATION: 98 % | BODY MASS INDEX: 28.83 KG/M2 | RESPIRATION RATE: 18 BRPM | DIASTOLIC BLOOD PRESSURE: 60 MMHG | WEIGHT: 143 LBS | HEART RATE: 78 BPM

## 2024-09-06 DIAGNOSIS — M51.36 DDD (DEGENERATIVE DISC DISEASE), LUMBAR: Primary | ICD-10-CM

## 2024-09-06 DIAGNOSIS — M48.061 SPINAL STENOSIS OF LUMBAR REGION, UNSPECIFIED WHETHER NEUROGENIC CLAUDICATION PRESENT: ICD-10-CM

## 2024-09-06 DIAGNOSIS — M47.817 LUMBOSACRAL SPONDYLOSIS WITHOUT MYELOPATHY: ICD-10-CM

## 2024-09-06 DIAGNOSIS — M96.1 POSTLAMINECTOMY SYNDROME, LUMBAR: ICD-10-CM

## 2024-09-06 PROCEDURE — 99213 OFFICE O/P EST LOW 20 MIN: CPT | Performed by: ANESTHESIOLOGY

## 2024-09-06 PROCEDURE — 3078F DIAST BP <80 MM HG: CPT | Performed by: ANESTHESIOLOGY

## 2024-09-06 PROCEDURE — 1123F ACP DISCUSS/DSCN MKR DOCD: CPT | Performed by: ANESTHESIOLOGY

## 2024-09-06 PROCEDURE — 99213 OFFICE O/P EST LOW 20 MIN: CPT

## 2024-09-06 PROCEDURE — 3074F SYST BP LT 130 MM HG: CPT | Performed by: ANESTHESIOLOGY

## 2024-09-06 RX ORDER — DULOXETIN HYDROCHLORIDE 30 MG/1
30 CAPSULE, DELAYED RELEASE ORAL DAILY
Qty: 90 CAPSULE | Refills: 1 | Status: SHIPPED | OUTPATIENT
Start: 2024-09-06

## 2024-09-06 RX ORDER — DULOXETIN HYDROCHLORIDE 30 MG/1
30 CAPSULE, DELAYED RELEASE ORAL DAILY
Qty: 30 CAPSULE | Refills: 3 | Status: SHIPPED
Start: 2024-09-06 | End: 2024-09-06

## 2024-09-06 NOTE — PROGRESS NOTES
Tricia Best presents to the [facility] on 9/6/2024. Tricia is complaining of pain back that radiates to bilateral hips and neck. The pain is constant. The pain is described as aching, throbbing, and stabbing. Pain is rated on her best day at a 6, on her worst day at a 10, and on average at a 6 on the VAS scale. She took her last dose of Cymbalta and Ibuprofen  Ibuprofen was a few days ago.      Any procedures since your last visit: No  She is  occasionally on NSAIDS and  is not on anticoagulation medications to include none   Pacemaker or defibrillator: No   Medication Contract and Consent for Opioid Use Documents Filed        No documents found                       Resp (!) 8   Ht 1.499 m (4' 11\")   Wt 64.9 kg (143 lb)   BMI 28.88 kg/m²      No LMP recorded. Patient is postmenopausal.  
Substances Monitoring: OARRS reviewed.     Fili Tan MD

## 2024-10-07 ENCOUNTER — HOSPITAL ENCOUNTER (OUTPATIENT)
Age: 69
Discharge: HOME | End: 2024-10-07
Payer: MEDICARE

## 2024-10-07 LAB
ALBUMIN SERPL-MCNC: 4.4 G/DL (ref 3.5–5.2)
ALP SERPL-CCNC: 71 U/L (ref 35–104)
ALT SERPL-CCNC: 34 U/L (ref 0–32)
AST SERPL-CCNC: 37 U/L (ref 0–31)
BILIRUB DIRECT SERPL-MCNC: <0.2 MG/DL (ref 0–0.3)
BILIRUB INDIRECT SERPL-MCNC: ABNORMAL MG/DL (ref 0–1)
BILIRUB SERPL-MCNC: 0.4 MG/DL (ref 0–1.2)
PROT SERPL-MCNC: 7 G/DL (ref 6.4–8.3)

## 2024-10-07 PROCEDURE — 36415 COLL VENOUS BLD VENIPUNCTURE: CPT

## 2024-10-07 PROCEDURE — 80076 HEPATIC FUNCTION PANEL: CPT

## 2024-12-04 ENCOUNTER — HOSPITAL ENCOUNTER (OUTPATIENT)
Dept: GENERAL RADIOLOGY | Age: 69
Discharge: HOME | End: 2024-12-06
Payer: MEDICARE

## 2024-12-04 DIAGNOSIS — Z12.31 VISIT FOR SCREENING MAMMOGRAM: ICD-10-CM

## 2024-12-04 PROCEDURE — 77063 BREAST TOMOSYNTHESIS BI: CPT

## 2025-02-28 ENCOUNTER — OFFICE VISIT (OUTPATIENT)
Dept: PAIN MANAGEMENT | Age: 70
End: 2025-02-28
Payer: MEDICARE

## 2025-02-28 ENCOUNTER — PREP FOR PROCEDURE (OUTPATIENT)
Dept: PAIN MANAGEMENT | Age: 70
End: 2025-02-28

## 2025-02-28 VITALS
SYSTOLIC BLOOD PRESSURE: 124 MMHG | BODY MASS INDEX: 28.83 KG/M2 | TEMPERATURE: 96.9 F | HEIGHT: 59 IN | RESPIRATION RATE: 18 BRPM | HEART RATE: 80 BPM | OXYGEN SATURATION: 96 % | WEIGHT: 143 LBS | DIASTOLIC BLOOD PRESSURE: 84 MMHG

## 2025-02-28 DIAGNOSIS — M96.1 POSTLAMINECTOMY SYNDROME, LUMBAR: ICD-10-CM

## 2025-02-28 DIAGNOSIS — M47.817 LUMBOSACRAL SPONDYLOSIS WITHOUT MYELOPATHY: ICD-10-CM

## 2025-02-28 DIAGNOSIS — M48.061 SPINAL STENOSIS OF LUMBAR REGION, UNSPECIFIED WHETHER NEUROGENIC CLAUDICATION PRESENT: ICD-10-CM

## 2025-02-28 DIAGNOSIS — M54.16 LUMBAR RADICULAR PAIN: Primary | ICD-10-CM

## 2025-02-28 DIAGNOSIS — M51.369 DEGENERATION OF INTERVERTEBRAL DISC OF LUMBAR REGION, UNSPECIFIED WHETHER PAIN PRESENT: ICD-10-CM

## 2025-02-28 PROCEDURE — 1159F MED LIST DOCD IN RCRD: CPT | Performed by: ANESTHESIOLOGY

## 2025-02-28 PROCEDURE — 1123F ACP DISCUSS/DSCN MKR DOCD: CPT | Performed by: ANESTHESIOLOGY

## 2025-02-28 PROCEDURE — 3079F DIAST BP 80-89 MM HG: CPT | Performed by: ANESTHESIOLOGY

## 2025-02-28 PROCEDURE — 99214 OFFICE O/P EST MOD 30 MIN: CPT | Performed by: ANESTHESIOLOGY

## 2025-02-28 PROCEDURE — 3074F SYST BP LT 130 MM HG: CPT | Performed by: ANESTHESIOLOGY

## 2025-02-28 RX ORDER — SODIUM CHLORIDE 0.9 % (FLUSH) 0.9 %
5-40 SYRINGE (ML) INJECTION EVERY 12 HOURS SCHEDULED
Status: CANCELLED | OUTPATIENT
Start: 2025-02-28

## 2025-02-28 RX ORDER — SODIUM CHLORIDE 0.9 % (FLUSH) 0.9 %
5-40 SYRINGE (ML) INJECTION PRN
Status: CANCELLED | OUTPATIENT
Start: 2025-02-28

## 2025-02-28 RX ORDER — SODIUM CHLORIDE 9 MG/ML
INJECTION, SOLUTION INTRAVENOUS PRN
Status: CANCELLED | OUTPATIENT
Start: 2025-02-28

## 2025-02-28 RX ORDER — PANTOPRAZOLE SODIUM 40 MG/1
40 TABLET, DELAYED RELEASE ORAL DAILY
COMMUNITY
Start: 2024-11-25

## 2025-02-28 RX ORDER — DULOXETIN HYDROCHLORIDE 30 MG/1
30 CAPSULE, DELAYED RELEASE ORAL DAILY
Qty: 90 CAPSULE | Refills: 1 | Status: SHIPPED | OUTPATIENT
Start: 2025-02-28

## 2025-02-28 NOTE — PROGRESS NOTES
Tricia Best instructed that procedure was scheduled for 3/11/2025 and that [facility] should call her a few days before for the pre op call and after 3:00 PM the business day before with the arrival time. Instructed Tricia to hold ibuprofen for 24 hours, Celebrex, Mobic, and naprosyn for 4 days and any aspirin containing products, CoQ 10, or fish oil for 7 days. Instructed to call office back if any questions. Tricia verbalized understanding.    Electronically signed by Tian Fields RN on 2/28/2025 at 4:50 PM

## 2025-02-28 NOTE — H&P (VIEW-ONLY)
[facility]  [address], [place] [address]  [phone]    Follow up Note      Tricia Best     Date of Visit:  2/28/2025    CC:  Patient presents for follow up   Chief Complaint   Patient presents with    Back Pain    Neck Pain       HPI:  Chronic low back and LE pain.     S/P L4-5 PLIF on 4/25/2023 by Dr. Castellano.     Continues to have Low back and LE pain Left > right.     LE pain mainly over the thigh.    Has been evaluated by Dr. Castellano- had MRI- adjacent segment pathology. Referred for interventions.     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     Pain causes functional limitations/ limits Adl's : Yes     Nursing notes and details of the pain history reviewed. Please see intake notes for details.     Previous treatments:   Physical Therapy : yes, continues HEP      Medications: - NSAID's : yes                        - Membrane stabilizers : yes - gabapentin/ Lyrica (caused weight gain)                       - Opioids : no                       - Adjuvants or Others : yes     Spine Surgeries: yes,     Anticoagulation medications: no      H/O Smoking: no  H/O alcohol abuse : no  H/O Illicit drug use : no     Employment: retired     Imaging:    MRI of LS spine W/ WO: 6/19/2024:  FINDINGS:  BONES/ALIGNMENT: There is normal alignment of the spine apart from slight  grade 1 anterolisthesis of L4 on L5 and L5 on S1.  The vertebral body heights  are maintained. The bone marrow signal appears unremarkable.     There are postsurgical changes including decompressive laminectomy and  posterior fusion at L4-5.  Bilateral pedicle screws are seen at the L4 and L5  levels and there is a disc spacer within the L4-5 disc space.     SPINAL CORD:  The conus terminates normally.  The posterior epidural lesion  seen previously at the L2 level appears to of resolved.  Was likely related  to an arachnoid cyst with some postsurgical

## 2025-02-28 NOTE — PROGRESS NOTES
[facility]  [address], [place] [address]  [phone]    Follow up Note      Tricia Best     Date of Visit:  2/28/2025    CC:  Patient presents for follow up   Chief Complaint   Patient presents with    Back Pain    Neck Pain       HPI:  Chronic low back and LE pain.     S/P L4-5 PLIF on 4/25/2023 by Dr. Castellano.     Continues to have Low back and LE pain Left > right.     LE pain mainly over the thigh.    Has been evaluated by Dr. Castellano- had MRI- adjacent segment pathology. Referred for interventions.     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     Pain causes functional limitations/ limits Adl's : Yes     Nursing notes and details of the pain history reviewed. Please see intake notes for details.     Previous treatments:   Physical Therapy : yes, continues HEP      Medications: - NSAID's : yes                        - Membrane stabilizers : yes - gabapentin/ Lyrica (caused weight gain)                       - Opioids : no                       - Adjuvants or Others : yes     Spine Surgeries: yes,     Anticoagulation medications: no      H/O Smoking: no  H/O alcohol abuse : no  H/O Illicit drug use : no     Employment: retired     Imaging:    MRI of LS spine W/ WO: 6/19/2024:  FINDINGS:  BONES/ALIGNMENT: There is normal alignment of the spine apart from slight  grade 1 anterolisthesis of L4 on L5 and L5 on S1.  The vertebral body heights  are maintained. The bone marrow signal appears unremarkable.     There are postsurgical changes including decompressive laminectomy and  posterior fusion at L4-5.  Bilateral pedicle screws are seen at the L4 and L5  levels and there is a disc spacer within the L4-5 disc space.     SPINAL CORD:  The conus terminates normally.  The posterior epidural lesion  seen previously at the L2 level appears to of resolved.  Was likely related  to an arachnoid cyst with some postsurgical

## 2025-02-28 NOTE — PROGRESS NOTES
Tricia Best presents to the [facility] on 2/28/2025. Tricia is complaining of pain neck and back that radiates to left thigh. The pain is constant. The pain is described as aching, throbbing, and stabbing. Pain is rated on her best day at a 6, on her worst day at a 10, and on average at a 7 on the VAS scale. She took her last dose of Cymbalta and ibuprofen today.      Any procedures since your last visit: No    She is  on NSAIDS and  is not on anticoagulation medications to include none and is managed by NA.     Pacemaker or defibrillator: No    Medication Contract and Consent for Opioid Use Documents Filed        No documents found                       Resp 18   Ht 1.499 m (4' 11\")   Wt 64.9 kg (143 lb)   BMI 28.88 kg/m²      No LMP recorded. Patient is postmenopausal.

## 2025-03-06 RX ORDER — VONOPRAZAN FUMARATE 26.72 MG/1
20 TABLET ORAL DAILY
COMMUNITY

## 2025-03-10 ENCOUNTER — ANESTHESIA EVENT (OUTPATIENT)
Dept: OPERATING ROOM | Age: 70
End: 2025-03-10
Payer: MEDICARE

## 2025-03-10 NOTE — ANESTHESIA PRE PROCEDURE
Department of Anesthesiology  Preprocedure Note       Name:  Tricia Best   Age:  69 y.o.  :  1955                                          MRN:  [id number]         Date:  3/10/2025      Surgeon: Surgeon(s):  Fili Tan MD    Procedure: Procedure(s):  LEFT LUMBAR 2 AND LUMBAR 3 TRANSFORAMINAL EPIDURAL STEROID INJECTION UNDER FLUOROSCOPIC GUIDANCEMRI w/ ANES    Medications prior to admission:   Prior to Admission medications    Medication Sig Start Date End Date Taking? Authorizing Provider   Vonoprazan Fumarate (VOQUEZNA) 20 MG TABS Take 20 mg by mouth daily   Yes Bennie Bynum MD   DULoxetine (CYMBALTA) 30 MG extended release capsule Take 1 capsule by mouth daily 25   Fili Tan MD   ofloxacin (OCUFLOX) 0.3 % solution INSTILL ONE DROP INTO RIGHT EYE TWICE DAILY WHILE BCL IN PLACE (FURTHER INSTRUCTION FROM DR. MARTINEZ) 24   Bennie Bynum MD   ibuprofen (ADVIL;MOTRIN) 800 MG tablet  10/21/23   Bennie Bynum MD   lisinopril (PRINIVIL;ZESTRIL) 20 MG tablet  23   Bennie Bynum MD   rosuvastatin (CRESTOR) 20 MG tablet Take 1 tablet by mouth daily    Bennie Bynum MD   NONFORMULARY Take 1,000 mg by mouth 3 times daily Red marine Calcium with Magnesium    Bennie Bynum MD   fluticasone (FLONASE) 50 MCG/ACT nasal spray 1-2 sprays by Nasal route as needed 3/18/22   Bennie Bynum MD   Cholecalciferol (VITAMIN D3) 5000 units TABS Take 0.5 tablets by mouth every other day    Bennie Bynum MD   zolpidem (AMBIEN) 5 MG tablet TAKE 1 TABLET BY MOUTH EVERY DAY AT BEDTIME AS NEEDED 19   Bennie Bynum MD       Current medications:    No current facility-administered medications for this encounter.     Current Outpatient Medications   Medication Sig Dispense Refill    Vonoprazan Fumarate (VOQUEZNA) 20 MG TABS Take 20 mg by mouth daily      DULoxetine (CYMBALTA) 30 MG extended release capsule Take 1 capsule by mouth

## 2025-03-11 ENCOUNTER — ANESTHESIA (OUTPATIENT)
Dept: OPERATING ROOM | Age: 70
End: 2025-03-11
Payer: MEDICARE

## 2025-03-11 ENCOUNTER — HOSPITAL ENCOUNTER (OUTPATIENT)
Dept: OPERATING ROOM | Age: 70
Setting detail: OUTPATIENT SURGERY
Discharge: HOME | End: 2025-03-11
Attending: ANESTHESIOLOGY
Payer: MEDICARE

## 2025-03-11 ENCOUNTER — HOSPITAL ENCOUNTER (OUTPATIENT)
Age: 70
Setting detail: OUTPATIENT SURGERY
Discharge: HOME | End: 2025-03-11
Attending: ANESTHESIOLOGY | Admitting: ANESTHESIOLOGY
Payer: MEDICARE

## 2025-03-11 VITALS
BODY MASS INDEX: 29.6 KG/M2 | TEMPERATURE: 97 F | WEIGHT: 146.8 LBS | HEIGHT: 59 IN | OXYGEN SATURATION: 97 % | HEART RATE: 68 BPM | RESPIRATION RATE: 14 BRPM | DIASTOLIC BLOOD PRESSURE: 64 MMHG | SYSTOLIC BLOOD PRESSURE: 108 MMHG

## 2025-03-11 DIAGNOSIS — M51.9 LUMBAR DISC DISEASE: ICD-10-CM

## 2025-03-11 PROCEDURE — 7100000011 HC PHASE II RECOVERY - ADDTL 15 MIN: Performed by: ANESTHESIOLOGY

## 2025-03-11 PROCEDURE — 3700000000 HC ANESTHESIA ATTENDED CARE: Performed by: ANESTHESIOLOGY

## 2025-03-11 PROCEDURE — 6360000004 HC RX CONTRAST MEDICATION: Performed by: ANESTHESIOLOGY

## 2025-03-11 PROCEDURE — 6360000002 HC RX W HCPCS

## 2025-03-11 PROCEDURE — 3600000005 HC SURGERY LEVEL 5 BASE: Performed by: ANESTHESIOLOGY

## 2025-03-11 PROCEDURE — 2709999900 HC NON-CHARGEABLE SUPPLY: Performed by: ANESTHESIOLOGY

## 2025-03-11 PROCEDURE — 64483 NJX AA&/STRD TFRM EPI L/S 1: CPT | Performed by: ANESTHESIOLOGY

## 2025-03-11 PROCEDURE — 2580000003 HC RX 258

## 2025-03-11 PROCEDURE — 2580000003 HC RX 258: Performed by: ANESTHESIOLOGY

## 2025-03-11 PROCEDURE — 7100000010 HC PHASE II RECOVERY - FIRST 15 MIN: Performed by: ANESTHESIOLOGY

## 2025-03-11 PROCEDURE — 6360000002 HC RX W HCPCS: Performed by: ANESTHESIOLOGY

## 2025-03-11 PROCEDURE — 64484 NJX AA&/STRD TFRM EPI L/S EA: CPT | Performed by: ANESTHESIOLOGY

## 2025-03-11 RX ORDER — SODIUM CHLORIDE, SODIUM LACTATE, POTASSIUM CHLORIDE, CALCIUM CHLORIDE 600; 310; 30; 20 MG/100ML; MG/100ML; MG/100ML; MG/100ML
INJECTION, SOLUTION INTRAVENOUS CONTINUOUS
Status: DISCONTINUED | OUTPATIENT
Start: 2025-03-11 | End: 2025-03-11 | Stop reason: HOSPADM

## 2025-03-11 RX ORDER — SODIUM CHLORIDE 0.9 % (FLUSH) 0.9 %
5-40 SYRINGE (ML) INJECTION EVERY 12 HOURS SCHEDULED
Status: DISCONTINUED | OUTPATIENT
Start: 2025-03-11 | End: 2025-03-11 | Stop reason: HOSPADM

## 2025-03-11 RX ORDER — SODIUM CHLORIDE 0.9 % (FLUSH) 0.9 %
5-40 SYRINGE (ML) INJECTION PRN
Status: DISCONTINUED | OUTPATIENT
Start: 2025-03-11 | End: 2025-03-11 | Stop reason: HOSPADM

## 2025-03-11 RX ORDER — FENTANYL CITRATE 50 UG/ML
INJECTION, SOLUTION INTRAMUSCULAR; INTRAVENOUS
Status: DISCONTINUED | OUTPATIENT
Start: 2025-03-11 | End: 2025-03-11 | Stop reason: SDUPTHER

## 2025-03-11 RX ORDER — SODIUM CHLORIDE, SODIUM LACTATE, POTASSIUM CHLORIDE, CALCIUM CHLORIDE 600; 310; 30; 20 MG/100ML; MG/100ML; MG/100ML; MG/100ML
INJECTION, SOLUTION INTRAVENOUS
Status: DISCONTINUED | OUTPATIENT
Start: 2025-03-11 | End: 2025-03-11 | Stop reason: SDUPTHER

## 2025-03-11 RX ORDER — DEXAMETHASONE SODIUM PHOSPHATE 10 MG/ML
INJECTION, SOLUTION INTRAMUSCULAR; INTRAVENOUS PRN
Status: DISCONTINUED | OUTPATIENT
Start: 2025-03-11 | End: 2025-03-11 | Stop reason: ALTCHOICE

## 2025-03-11 RX ORDER — LIDOCAINE HYDROCHLORIDE 5 MG/ML
INJECTION, SOLUTION INFILTRATION; INTRAVENOUS PRN
Status: DISCONTINUED | OUTPATIENT
Start: 2025-03-11 | End: 2025-03-11 | Stop reason: ALTCHOICE

## 2025-03-11 RX ORDER — MIDAZOLAM HYDROCHLORIDE 1 MG/ML
INJECTION, SOLUTION INTRAMUSCULAR; INTRAVENOUS
Status: DISCONTINUED | OUTPATIENT
Start: 2025-03-11 | End: 2025-03-11 | Stop reason: SDUPTHER

## 2025-03-11 RX ORDER — SODIUM CHLORIDE 9 MG/ML
INJECTION, SOLUTION INTRAVENOUS PRN
Status: DISCONTINUED | OUTPATIENT
Start: 2025-03-11 | End: 2025-03-11 | Stop reason: HOSPADM

## 2025-03-11 RX ADMIN — FENTANYL CITRATE 50 MCG: 50 INJECTION, SOLUTION INTRAMUSCULAR; INTRAVENOUS at 10:44

## 2025-03-11 RX ADMIN — MIDAZOLAM 2 MG: 1 INJECTION INTRAMUSCULAR; INTRAVENOUS at 10:41

## 2025-03-11 RX ADMIN — SODIUM CHLORIDE, POTASSIUM CHLORIDE, SODIUM LACTATE AND CALCIUM CHLORIDE: 600; 310; 30; 20 INJECTION, SOLUTION INTRAVENOUS at 10:39

## 2025-03-11 RX ADMIN — FENTANYL CITRATE 25 MCG: 50 INJECTION, SOLUTION INTRAMUSCULAR; INTRAVENOUS at 10:42

## 2025-03-11 RX ADMIN — SODIUM CHLORIDE, POTASSIUM CHLORIDE, SODIUM LACTATE AND CALCIUM CHLORIDE: 600; 310; 30; 20 INJECTION, SOLUTION INTRAVENOUS at 10:19

## 2025-03-11 RX ADMIN — FENTANYL CITRATE 25 MCG: 50 INJECTION, SOLUTION INTRAMUSCULAR; INTRAVENOUS at 10:41

## 2025-03-11 ASSESSMENT — PAIN - FUNCTIONAL ASSESSMENT
PAIN_FUNCTIONAL_ASSESSMENT: NONE - DENIES PAIN
PAIN_FUNCTIONAL_ASSESSMENT: NONE - DENIES PAIN
PAIN_FUNCTIONAL_ASSESSMENT: 0-10
PAIN_FUNCTIONAL_ASSESSMENT: NONE - DENIES PAIN
PAIN_FUNCTIONAL_ASSESSMENT: NONE - DENIES PAIN
PAIN_FUNCTIONAL_ASSESSMENT: PREVENTS OR INTERFERES SOME ACTIVE ACTIVITIES AND ADLS

## 2025-03-11 ASSESSMENT — PAIN DESCRIPTION - DESCRIPTORS: DESCRIPTORS: ACHING;BURNING

## 2025-03-11 NOTE — OP NOTE
Operative Note      Patient: Tricia Best  YOB: 1955  MRN: [id number]    Date of Procedure: 3/11/2025    Pre-Op Diagnosis Codes:      * Lumbar radicular pain [M54.16]    Post-Op Diagnosis: Same       Procedure(s):  LEFT LUMBAR 2 AND LUMBAR 3 TRANSFORAMINAL EPIDURAL STEROID INJECTION UNDER FLUOROSCOPIC GUIDANCE    Surgeon(s):  Fili Tan MD    Assistant:   * No surgical staff found *    Anesthesia: Monitor Anesthesia Care    Estimated Blood Loss (mL): Minimal    Complications: None    Specimens:   * No specimens in log *    Implants:  * No implants in log *      Drains: * No LDAs found *    Findings:  Infection Present At Time Of Surgery (PATOS) (choose all levels that have infection present):  No infection present  Other Findings: good needle placement    Detailed Description of Procedure:   3/11/2025    Patient: Tricia Best  :  1955  Age:  69 y.o.  Sex:  female     PRE-OPERATIVE DIAGNOSIS: Lumbar disc displacement, lumbar neural foraminal stenosis, lumbar radiculopathy.     POST-OPERATIVE DIAGNOSIS: Same.    PROCEDURE: Left Transforaminal epidural steroid injection under fluoroscopic guidance at foraminal level L2 & L3.    SURGEON: Fili Tan MD    ANESTHESIA: MAC    ESTIMATED BLOOD LOSS: None.  ______________________________________________________________________  BRIEF HISTORY: Tricia Best comes in today for the  Left transforaminal epidural steroid injection under fluoroscopic guidance at foraminal level L2 & L3 . The potential complications of this procedure were discussed with her again today.  She has elected to undergo the aforementioned procedure.     Tricia’s complete History & Physical examination were reviewed in depth, a copy of which is in the chart.      DESCRIPTION OF PROCEDURE:    After confirming written and informed consent, a time-out was performed and Tricia’s name and date of birth, the procedure to be performed as well as the plan for

## 2025-03-11 NOTE — DISCHARGE INSTRUCTIONS
[facility]  [phone]   Post-Pain Block/ Radiofrequency Home Going Instructions    1-Go home, rest for the remainder of the day  2-Please do not lift over 20 pounds the day of the injection  3-If you received sedation No: alcohol, driving, operating lawn mowers, plows, tractors or other dangerous equipment until next morning. Do not make important decisions or sign legal documents for 24 hours. You may experience light headedness, dizziness, nausea or sleepiness after sedation. Do not stay alone. A responsible adult must be with you for 24 hours. You could be nauseated from the medications you have received. Your IV site may be sore and bruised.    4-No dietary restrictions     5-Resume all medications the same day, blood thinners to be resumed 24 hours after injection    6-Keep the surgical site clean and dry, you may shower the next morning and remove the      dressing.     7- No sitz baths, tub baths or hot tubs/swimming for 24 hours.       8- If you have any pain at the injection site(s), application of an ice pack to the area should be       helpful, 20 minutes on/20 minutes off for next 48 hours.  9- Call [facility] pain management immediately at if you develop.  Fever greater than 100.4 F  Have bleeding or drainage from the puncture site  Have progressive Leg/arm numbness and or weakness  Loss of control of bowel and or bladder (wet/soil yourself)  Severe headache with inability to lift head  10-You may return to work the next day           Infection After Surgery: Care Instructions  Overview  After surgery, an infection is always possible. It doesn't mean that the surgery didn't go well.  Because an infection can be serious, your doctor has taken steps to manage it.  Your doctor checked the infection and cleaned it if necessary. Your doctor may have made an opening in the area so that the pus can drain out. You may have gauze in the cut so that the area will stay open and keep

## 2025-03-11 NOTE — ANESTHESIA POSTPROCEDURE EVALUATION
Department of Anesthesiology  Postprocedure Note    Patient: Tricia Best  MRN: [id number]  YOB: 1955  Date of evaluation: 3/11/2025    Procedure Summary       Date: 03/11/25 Room / Location: [address]    Anesthesia Start: 1039 Anesthesia Stop: 1051    Procedure: LEFT LUMBAR 2 AND LUMBAR 3 TRANSFORAMINAL EPIDURAL STEROID INJECTION UNDER FLUOROSCOPIC GUIDANCE (Left: Back) Diagnosis:       Lumbar radicular pain      (Lumbar radicular pain [M54.16])    Surgeons: Fili Tan MD Responsible Provider: Lianne Valencia MD    Anesthesia Type: MAC ASA Status: 3            Anesthesia Type: MAC    Estefania Phase I: Estefania Score: 10    Estefania Phase II: Estefania Score: 10    Anesthesia Post Evaluation    Patient location during evaluation: PACU  Patient participation: complete - patient participated  Level of consciousness: awake  Pain score: 0  Airway patency: patent  Nausea & Vomiting: no nausea  Cardiovascular status: hemodynamically stable  Respiratory status: acceptable  Hydration status: stable  Multimodal analgesia pain management approach    No notable events documented.

## 2025-03-11 NOTE — INTERVAL H&P NOTE
Update History & Physical    The patient's History and Physical of February 28, 2025 was reviewed with the patient and I examined the patient. There was no change. The surgical site was confirmed by the patient and me.     Plan: The risks, benefits, expected outcome, and alternative to the recommended procedure have been discussed with the patient. Patient understands and wants to proceed with the procedure.     Electronically signed by Fili Tan MD on 3/11/2025

## 2025-04-04 ENCOUNTER — OFFICE VISIT (OUTPATIENT)
Dept: PAIN MANAGEMENT | Age: 70
End: 2025-04-04
Payer: MEDICARE

## 2025-04-04 VITALS
OXYGEN SATURATION: 97 % | HEIGHT: 59 IN | HEART RATE: 75 BPM | BODY MASS INDEX: 29.43 KG/M2 | SYSTOLIC BLOOD PRESSURE: 158 MMHG | WEIGHT: 146 LBS | TEMPERATURE: 96.9 F | RESPIRATION RATE: 18 BRPM | DIASTOLIC BLOOD PRESSURE: 80 MMHG

## 2025-04-04 DIAGNOSIS — M48.061 SPINAL STENOSIS OF LUMBAR REGION, UNSPECIFIED WHETHER NEUROGENIC CLAUDICATION PRESENT: ICD-10-CM

## 2025-04-04 DIAGNOSIS — M47.817 LUMBOSACRAL SPONDYLOSIS WITHOUT MYELOPATHY: ICD-10-CM

## 2025-04-04 DIAGNOSIS — M96.1 POSTLAMINECTOMY SYNDROME, LUMBAR: Primary | ICD-10-CM

## 2025-04-04 DIAGNOSIS — M51.369 DEGENERATION OF INTERVERTEBRAL DISC OF LUMBAR REGION, UNSPECIFIED WHETHER PAIN PRESENT: ICD-10-CM

## 2025-04-04 PROCEDURE — 3079F DIAST BP 80-89 MM HG: CPT | Performed by: ANESTHESIOLOGY

## 2025-04-04 PROCEDURE — 99213 OFFICE O/P EST LOW 20 MIN: CPT | Performed by: ANESTHESIOLOGY

## 2025-04-04 PROCEDURE — 3077F SYST BP >= 140 MM HG: CPT | Performed by: ANESTHESIOLOGY

## 2025-04-04 PROCEDURE — 1123F ACP DISCUSS/DSCN MKR DOCD: CPT | Performed by: ANESTHESIOLOGY

## 2025-04-04 PROCEDURE — 1159F MED LIST DOCD IN RCRD: CPT | Performed by: ANESTHESIOLOGY

## 2025-04-04 NOTE — PROGRESS NOTES
Tricia Best presents to the [facility] on 4/4/2025. Tricia is complaining of pain in her neck and back that radiates to left thigh. The pain is constant. The pain is described as aching, throbbing, burning and stabbing. Pain is rated on her best day at a 7, on her worst day at a 10, and on average at a 8 on the VAS scale. She took her last dose of Motrin and Cymbalta Today.      Any procedures since your last visit: Yes, with little relief in her lower back.    She is  on NSAIDS and  is not on anticoagulation medications to include none and is managed by [facility].     Pacemaker or defibrillator: No     Medication Contract and Consent for Opioid Use Documents Filed        No documents found                       Resp 18   Ht 1.499 m (4' 11.02\")   Wt 66.2 kg (146 lb)   BMI 29.47 kg/m²      No LMP recorded. Patient is postmenopausal.  
consider SCS trial- Detailed discussion about this. Information video given.     Counseling :Patient encouraged to stay active and to watch/lose weight     Encouraged to continue Regular home exercise program as tolerated - stretching / strengthening.     Treatment plan discussed with the patient including medication and procedure side effects.     Controlled Substances Monitoring: [place] reviewed.     Fili Tan MD

## 2025-04-11 ENCOUNTER — TELEPHONE (OUTPATIENT)
Dept: NEUROSURGERY | Age: 70
End: 2025-04-11

## 2025-04-11 NOTE — TELEPHONE ENCOUNTER
Patient called requesting a call back. She still sees Dr. Tan and he recommended a SCS trial after 1 failed IHSAN. She would like to speak with Elisabeth if possible as she is unsure about this. She was last seen with us in June of 2024, please advise if appt is required. Thank you.

## 2025-05-19 ENCOUNTER — TELEPHONE (OUTPATIENT)
Age: 70
End: 2025-05-19

## 2025-06-02 NOTE — TELEPHONE ENCOUNTER
She left message on the nurse line stating that she has weaned herself off of the Cymbalta and is asking what she can do for the nausea, diarrhea, and headache.

## (undated) DEVICE — APPLICATOR PREP 26ML 0.7% IOD POVACRYLEX 74% ISO ALC ST

## (undated) DEVICE — KIT EVAC 400CC DIA1/8IN H PAT 12.5IN 3 SPR RND SHP PVC DRN

## (undated) DEVICE — COUNTER NDL 10 COUNT HLD 20 FOAM BLK SGL MAG

## (undated) DEVICE — GOWN,SIRUS,FABRNF,XL,20/CS: Brand: MEDLINE

## (undated) DEVICE — SPHERE EYE 1 MRK GUIDANCE PASS STEALTHSTATION 1PK/EA

## (undated) DEVICE — 6 ML SYRINGE LUER-LOCK TIP: Brand: MONOJECT

## (undated) DEVICE — NEEDLE HYPO 18GA L1.5IN PNK POLYPR HUB S STL REG BVL STR

## (undated) DEVICE — SYRINGE 20ML LL S/C 50

## (undated) DEVICE — TUBING SUCT 12FR MAL ALUM SHFT FN CAP VENT UNIV CONN W/ OBT

## (undated) DEVICE — Device: Brand: SNAP ON SPHERZ

## (undated) DEVICE — NDL, WHITACRE SPINAL, 22GX3.5": Brand: MEDLINE

## (undated) DEVICE — NEEDLE SPNL L3.5IN PNK HUB S STL REG WALL FIT STYL W/ QNCKE

## (undated) DEVICE — NON-DEHP CATHETER EXTENSION SET, MALE LUER LOCK ADAPTER

## (undated) DEVICE — 3M™ RED DOT™ MONITORING ELECTRODE WITH FOAM TAPE AND STICKY GEL 2560, 50/BAG, 20/CASE, 72/PLT: Brand: RED DOT™

## (undated) DEVICE — HYPODERMIC SAFETY NEEDLE: Brand: MAGELLAN

## (undated) DEVICE — NEEDLE HYPO 25GA L1.5IN BLU POLYPR HUB S STL REG BVL STR

## (undated) DEVICE — JACKSON TABLE POSITIONER KIT: Brand: MEDLINE INDUSTRIES, INC.

## (undated) DEVICE — GLOVE ORANGE PI 8   MSG9080

## (undated) DEVICE — THE MILL DISPOSABLE - FINE

## (undated) DEVICE — NEEDLE, QUINCKE, 22GX5": Brand: MEDLINE

## (undated) DEVICE — GOWN,SIRUS,FABRNF,L,20/CS: Brand: MEDLINE

## (undated) DEVICE — Device

## (undated) DEVICE — LUMBAR LAMINECTOMY: Brand: MEDLINE INDUSTRIES, INC.

## (undated) DEVICE — DRESSING ADH N ADH 8X35 IN 6X175 IN SFT CLTH MEDIPORE +

## (undated) DEVICE — DRAPE,REIN 53X77,STERILE: Brand: MEDLINE

## (undated) DEVICE — TUBING, SUCTION, 3/16" X 12', STRAIGHT: Brand: MEDLINE

## (undated) DEVICE — GLOVE ORANGE PI 7 1/2   MSG9075

## (undated) DEVICE — APPLICATOR MEDICATED 10.5 CC SOLUTION HI LT ORNG CHLORAPREP

## (undated) DEVICE — 3M™ IOBAN™ 2 ANTIMICROBIAL INCISE DRAPE 6650EZ: Brand: IOBAN™ 2

## (undated) DEVICE — TOWEL,OR,DSP,ST,BLUE,STD,6/PK,12PK/CS: Brand: MEDLINE

## (undated) DEVICE — GLOVE SURG 8.5 PF POLYMER WHT STRL SIGN LTX ESSENTIAL LTX

## (undated) DEVICE — BANDAGE ADH W1XL3IN NAT FAB WVN FLX DURABLE N ADH PD SEAL

## (undated) DEVICE — MARKER,SKIN,WI/RULER AND LABELS: Brand: MEDLINE

## (undated) DEVICE — 5.0MM PRECISION ROUND

## (undated) DEVICE — BLADE ES L6IN ELASTOMERIC COAT EXT DURABLE BEND UPTO 90DEG

## (undated) DEVICE — PREMIUM WET SKIN PREP TRAY: Brand: MEDLINE INDUSTRIES, INC.

## (undated) DEVICE — 12 ML SYRINGE,LUER-LOCK TIP: Brand: MONOJECT

## (undated) DEVICE — GAUZE,SPONGE,4"X4",12PLY,STERILE,LF,2'S: Brand: MEDLINE

## (undated) DEVICE — SHEET,DRAPE,40X58,STERILE: Brand: MEDLINE

## (undated) DEVICE — 3 ML SYRINGE LUER-LOCK TIP: Brand: MONOJECT